# Patient Record
Sex: FEMALE | Race: WHITE | NOT HISPANIC OR LATINO | Employment: UNEMPLOYED | URBAN - METROPOLITAN AREA
[De-identification: names, ages, dates, MRNs, and addresses within clinical notes are randomized per-mention and may not be internally consistent; named-entity substitution may affect disease eponyms.]

---

## 2023-08-27 ENCOUNTER — HOSPITAL ENCOUNTER (EMERGENCY)
Facility: HOSPITAL | Age: 43
Discharge: HOME | End: 2023-08-27
Attending: EMERGENCY MEDICINE
Payer: OTHER GOVERNMENT

## 2023-08-27 VITALS
DIASTOLIC BLOOD PRESSURE: 75 MMHG | RESPIRATION RATE: 18 BRPM | TEMPERATURE: 99 F | HEIGHT: 64 IN | SYSTOLIC BLOOD PRESSURE: 101 MMHG | HEART RATE: 75 BPM | OXYGEN SATURATION: 98 % | BODY MASS INDEX: 25.61 KG/M2 | WEIGHT: 150 LBS

## 2023-08-27 DIAGNOSIS — L03.113 CELLULITIS OF RIGHT UPPER EXTREMITY: Primary | ICD-10-CM

## 2023-08-27 PROCEDURE — 99283 EMERGENCY DEPT VISIT LOW MDM: CPT

## 2023-08-27 RX ORDER — SULFAMETHOXAZOLE AND TRIMETHOPRIM 800; 160 MG/1; MG/1
1 TABLET ORAL 2 TIMES DAILY
Qty: 14 TABLET | Refills: 0 | Status: SHIPPED | OUTPATIENT
Start: 2023-08-27 | End: 2023-09-03

## 2023-08-27 NOTE — ED PROVIDER NOTES
Encounter Date: 8/27/2023       History     Chief Complaint   Patient presents with    Rash     [place] and now has a rash to her right arm.      41-year-old female no past medical history presents emergency room for evaluation of rash.  Patient [place] and was constant, and then noticed a lesion that looks like a bug bite.  She said then progressively developed into what it looks like now.  Never purulent.  No associated systemic symptoms.      Review of patient's allergies indicates:  No Known Allergies  No past medical history on file.  No past surgical history on file.  No family history on file.     Review of Systems   Constitutional:  Negative for chills, fatigue and fever.   HENT:  Negative for congestion, hearing loss, sore throat and trouble swallowing.    Eyes:  Negative for visual disturbance.   Respiratory:  Negative for cough, chest tightness and shortness of breath.    Cardiovascular:  Negative for chest pain.   Gastrointestinal:  Negative for abdominal pain and nausea.   Endocrine: Negative for polyuria.   Genitourinary:  Negative for difficulty urinating.   Musculoskeletal:  Negative for arthralgias and myalgias.   Skin:  Positive for rash.   Neurological:  Negative for dizziness and headaches.   Psychiatric/Behavioral:  The patient is not nervous/anxious.        Physical Exam     Initial Vitals [08/27/23 1613]   BP Pulse Resp Temp SpO2   101/75 75 18 98.5 °F (36.9 °C) 98 %      MAP       --         Physical Exam    Nursing note and vitals reviewed.  Constitutional: She appears well-developed and well-nourished.   HENT:   Head: Normocephalic and atraumatic.   Eyes: Conjunctivae are normal.   Neck: Neck supple.   Cardiovascular:  Intact distal pulses.           Pulmonary/Chest: No respiratory distress.   Musculoskeletal:         General: Normal range of motion.      Cervical back: Neck supple.     Neurological: She is alert and oriented to person, place, and time.  GCS score is 15. GCS eye subscore is 4. GCS verbal subscore is 5. GCS motor subscore is 6.   Skin: Skin is warm and dry. Capillary refill takes less than 2 seconds.   There is a 0.5 x 1 cm area of erythema and mild induration without fluctuance over the right anterior arm.   Psychiatric: She has a normal mood and affect. Thought content normal.         ED Course   Procedures  Labs Reviewed - No data to display       Imaging Results    None          Medications - No data to display  Medical Decision Making  43-year-old female with no past medical history presents for evaluation of rash.  Likely there is a mild bacterial infection/cellulitic infection to the right anterior arm.  Patient is a systemic signs of illness.  She is certainly nontoxic well-appearing.  I will initiate Bactrim given recent [place] a [place] however expect she will improve with outpatient management.  Return precautions discussed.        Risk  Prescription drug management.                               Clinical Impression:   Final diagnoses:  [L03.113] Cellulitis of right upper extremity (Primary)        ED Disposition Condition    Discharge Stable          ED Prescriptions       Medication Sig Dispense Start Date End Date Auth. Provider    sulfamethoxazole-trimethoprim 800-160mg (BACTRIM DS) 800-160 mg Tab Take 1 tablet by mouth 2 (two) times daily. for 7 days 14 tablet 8/27/2023 9/3/2023 Farshad Gaming PA-C          Follow-up Information       Follow up With Specialties Details Why Contact Info Additional Information    [facility] - Emergency Dept Emergency Medicine Go to  As needed, If symptoms worsen [address] [facility] [address]  [phone] 1st floor             Farshad Gaming PA-C  08/27/23 0687

## 2023-08-27 NOTE — DISCHARGE INSTRUCTIONS
- Please read all discharge instructions. Follow all written and verbal discharge instructions.   - Continue all home medications as prescribed and start any new prescriptions given to you in the emergency room.   - Followup with your primary care and make them aware of your recent ER visit. Followup with any additional providers or specialists as discussed.   - Return to the nearest emergency room for any new or worsening symptoms, non-resolution of your current symptoms or if you feel you need reevaluation.    - Do not hesitate to contact the emergency department if you have questions about your diagnosis, discharge instructions, followup recommendations, or medications.

## 2023-10-08 ENCOUNTER — HOSPITAL ENCOUNTER (EMERGENCY)
Facility: HOSPITAL | Age: 43
Discharge: HOME | End: 2023-10-08
Attending: STUDENT IN AN ORGANIZED HEALTH CARE EDUCATION/TRAINING PROGRAM
Payer: OTHER GOVERNMENT

## 2023-10-08 VITALS
HEIGHT: 64 IN | BODY MASS INDEX: 25.61 KG/M2 | OXYGEN SATURATION: 100 % | TEMPERATURE: 98 F | SYSTOLIC BLOOD PRESSURE: 119 MMHG | HEART RATE: 72 BPM | DIASTOLIC BLOOD PRESSURE: 74 MMHG | RESPIRATION RATE: 18 BRPM | WEIGHT: 150 LBS

## 2023-10-08 DIAGNOSIS — R21 RASH AND NONSPECIFIC SKIN ERUPTION: Primary | ICD-10-CM

## 2023-10-08 PROCEDURE — 99283 EMERGENCY DEPT VISIT LOW MDM: CPT

## 2023-10-08 RX ORDER — CLOTRIMAZOLE 1 %
CREAM (GRAM) TOPICAL 2 TIMES DAILY
Qty: 12 G | Refills: 0 | Status: SHIPPED | OUTPATIENT
Start: 2023-10-08

## 2023-10-08 NOTE — DISCHARGE INSTRUCTIONS

## 2023-10-08 NOTE — ED PROVIDER NOTES
Encounter Date: 10/8/2023       History     Chief Complaint   Patient presents with    Rash     Right arm, right face, abdomen     43-year-old female presents for three small lesions, a centimeter area on her right medial forearm, another on the right lateral temporal, and a third on the left side of her stomach. She was seen here a few weeks ago for a similar lesion on the right upper arm, which healed on its own.       Review of patient's allergies indicates:  No Known Allergies  No past medical history on file.  No past surgical history on file.  No family history on file.     Review of Systems   Constitutional:  Negative for activity change, appetite change, chills, fever and unexpected weight change.   HENT:  Negative for dental problem and drooling.    Eyes:  Negative for discharge and itching.   Respiratory:  Negative for cough, chest tightness, shortness of breath, wheezing and stridor.    Cardiovascular:  Negative for chest pain, palpitations and leg swelling.   Gastrointestinal:  Negative for abdominal distention, abdominal pain, diarrhea and nausea.   Genitourinary:  Negative for difficulty urinating, dysuria, frequency and urgency.   Musculoskeletal:  Negative for back pain, gait problem and joint swelling.   Skin:  Positive for rash.   Neurological:  Negative for dizziness, syncope, numbness and headaches.   Psychiatric/Behavioral:  Negative for agitation, behavioral problems and confusion.        Physical Exam     Initial Vitals [10/08/23 0939]   BP Pulse Resp Temp SpO2   123/85 67 18 98.4 °F (36.9 °C) 98 %      MAP       --         Physical Exam    Nursing note and vitals reviewed.  Constitutional: She appears well-developed and well-nourished. She is not diaphoretic.   HENT:   Head: Normocephalic and atraumatic.   Mouth/Throat: Oropharynx is clear and moist.   Eyes: EOM are normal. Pupils are equal, round, and reactive to light. Right eye exhibits no discharge. Left eye exhibits no discharge.   Neck:  No tracheal deviation present.   Normal range of motion.  Cardiovascular:  Normal rate, regular rhythm and intact distal pulses.           Pulmonary/Chest: No respiratory distress. She has no wheezes. She exhibits no tenderness.   Abdominal: Abdomen is soft. She exhibits no distension. There is no abdominal tenderness.   Musculoskeletal:         General: No tenderness or edema. Normal range of motion.      Cervical back: Normal range of motion.     Neurological: She is alert and oriented to person, place, and time. She has normal strength. No cranial nerve deficit or sensory deficit. GCS eye subscore is 4. GCS verbal subscore is 5. GCS motor subscore is 6.   Skin: Skin is warm and dry. Rash noted.   Small lesion on the right upper arm, left lower abdomen and right temporal region   Psychiatric: She has a normal mood and affect. Her behavior is normal. Thought content normal.         ED Course   Procedures  Labs Reviewed - No data to display       Imaging Results    None          Medications - No data to display  Medical Decision Making                           Lesions are subcentimeter, spread out over space and time, covering the right medial arm, right temporal region, left lower quadrant.  They are nontender, nonedematous, doubt acute infection such as cellulitis.  Given the patient's child was diagnosed with ringworm, will give antifungals.  They could also be erythematous reaction to excoriation, versus hypersensitivity reaction.  No evidence of systemic infection, no evidence of anaphylaxis, no evidence of Brooks Javed syndrome, no evidence of life-threatening rash at this time.  Advised patient to follow-up outpatient with Dermatology.    Clinical Impression:   Final diagnoses:  [R21] Rash and nonspecific skin eruption (Primary)        ED Disposition Condition    Discharge Stable          ED Prescriptions       Medication Sig Dispense Start Date End Date Auth. Provider    clotrimazole (LOTRIMIN) 1 % cream  Apply topically 2 (two) times daily. 12 g 10/8/2023 -- Nilson Nassar MD          Follow-up Information       Follow up With Specialties Details Why Contact Info    [facility] Call in 1 day To set up a follow-up appointment, To recheck today's symptoms [address] [address]  [phone]             Nilson Nassar MD  10/08/23 1512

## 2023-10-09 ENCOUNTER — TELEPHONE (OUTPATIENT)
Dept: ADMINISTRATIVE | Facility: CLINIC | Age: 43
End: 2023-10-09

## 2023-10-16 ENCOUNTER — TELEPHONE (OUTPATIENT)
Dept: FAMILY MEDICINE | Facility: CLINIC | Age: 43
End: 2023-10-16
Payer: OTHER GOVERNMENT

## 2023-10-16 NOTE — TELEPHONE ENCOUNTER
Called and spoke to pt about setting up Dermatology appt p/Referral  Pt says she spoke to her Insurance Provider and was told she needed to be seen by a pcp first, She has an appt this Wednesday to get this taken care of

## 2023-10-18 ENCOUNTER — TELEPHONE (OUTPATIENT)
Dept: FAMILY MEDICINE | Facility: CLINIC | Age: 43
End: 2023-10-18

## 2023-10-18 ENCOUNTER — OFFICE VISIT (OUTPATIENT)
Dept: FAMILY MEDICINE | Facility: CLINIC | Age: 43
End: 2023-10-18
Payer: OTHER GOVERNMENT

## 2023-10-18 VITALS
HEIGHT: 64 IN | DIASTOLIC BLOOD PRESSURE: 82 MMHG | OXYGEN SATURATION: 98 % | HEART RATE: 83 BPM | WEIGHT: 158.81 LBS | BODY MASS INDEX: 27.11 KG/M2 | SYSTOLIC BLOOD PRESSURE: 116 MMHG

## 2023-10-18 DIAGNOSIS — Z01.419 ENCOUNTER FOR CERVICAL PAP SMEAR WITH PELVIC EXAM: ICD-10-CM

## 2023-10-18 DIAGNOSIS — Z76.89 ESTABLISHING CARE WITH NEW DOCTOR, ENCOUNTER FOR: Primary | ICD-10-CM

## 2023-10-18 DIAGNOSIS — L08.9 SKIN INFECTION: ICD-10-CM

## 2023-10-18 DIAGNOSIS — Z12.11 ENCOUNTER FOR COLONOSCOPY IN PATIENT WITH FAMILY HISTORY OF COLON CANCER: ICD-10-CM

## 2023-10-18 DIAGNOSIS — Z80.0 ENCOUNTER FOR COLONOSCOPY IN PATIENT WITH FAMILY HISTORY OF COLON CANCER: ICD-10-CM

## 2023-10-18 DIAGNOSIS — Z12.31 OTHER SCREENING MAMMOGRAM: ICD-10-CM

## 2023-10-18 PROCEDURE — 99204 PR OFFICE/OUTPT VISIT, NEW, LEVL IV, 45-59 MIN: ICD-10-PCS | Mod: S$GLB,,,

## 2023-10-18 PROCEDURE — 99204 OFFICE O/P NEW MOD 45 MIN: CPT | Mod: S$GLB,,,

## 2023-10-18 RX ORDER — ALBUTEROL SULFATE 90 UG/1
AEROSOL, METERED RESPIRATORY (INHALATION)
COMMUNITY
Start: 2023-07-12 | End: 2024-07-10

## 2023-10-18 RX ORDER — ALBUTEROL SULFATE 90 UG/1
AEROSOL, METERED RESPIRATORY (INHALATION)
COMMUNITY
Start: 2023-07-12

## 2023-10-18 RX ORDER — LORATADINE 10 MG/1
10 TABLET ORAL
COMMUNITY
Start: 2023-07-05

## 2023-10-18 RX ORDER — MUPIROCIN 20 MG/G
OINTMENT TOPICAL 3 TIMES DAILY
Qty: 22 G | Refills: 2 | Status: SHIPPED | OUTPATIENT
Start: 2023-10-18

## 2023-10-18 RX ORDER — OMEPRAZOLE 20 MG/1
20 CAPSULE, DELAYED RELEASE ORAL
COMMUNITY
Start: 2023-05-19

## 2023-10-18 RX ORDER — PANTOPRAZOLE SODIUM 20 MG/1
20 TABLET, DELAYED RELEASE ORAL
COMMUNITY
Start: 2023-07-12

## 2023-10-18 RX ORDER — FLUTICASONE PROPIONATE AND SALMETEROL XINAFOATE 45; 21 UG/1; UG/1
AEROSOL, METERED RESPIRATORY (INHALATION)
COMMUNITY
Start: 2023-07-12

## 2023-10-18 RX ORDER — KETOCONAZOLE 20 MG/G
CREAM TOPICAL DAILY
Qty: 15 G | Refills: 2 | Status: SHIPPED | OUTPATIENT
Start: 2023-10-18

## 2023-10-18 NOTE — TELEPHONE ENCOUNTER
----- Message from Latanya Medellin sent at 10/18/2023 12:12 PM CDT -----  Pt would like to know when she needs to schedule her next appointment.  [phone]

## 2023-10-18 NOTE — PATIENT INSTRUCTIONS
Hibiclens or store brand equivalent over the counter antibacterial soap. Wash daily, neck down. Do not use on face.    Nizoral is an antifungal shampoo over the counter.    Dr. Cruz with [facility] Dr. Weil external

## 2023-10-19 ENCOUNTER — TELEPHONE (OUTPATIENT)
Dept: FAMILY MEDICINE | Facility: CLINIC | Age: 43
End: 2023-10-19
Payer: OTHER GOVERNMENT

## 2023-10-19 NOTE — TELEPHONE ENCOUNTER
Spoke to pt and informed no soone appt's with Derm  She will call for an External appt w/Dr M. Weil p/Referral

## 2023-10-20 ENCOUNTER — TELEPHONE (OUTPATIENT)
Dept: FAMILY MEDICINE | Facility: CLINIC | Age: 43
End: 2023-10-20

## 2023-10-20 NOTE — TELEPHONE ENCOUNTER
----- Message from Latanya Medellin sent at 10/20/2023  9:08 AM CDT -----  Pt calling to get her referral sent to Dr. Michael Weil on [address] because they can get her in the office in November.    [phone]

## 2023-10-20 NOTE — PROGRESS NOTES
SUBJECTIVE:    Patient ID: Lizbeth Berry is a 43 y.o. female.    Chief Complaint: Establish Care (No bottles//Pt here to establish care//discuss rash and derm referral//declines flu vacc//JL)    43 year old female presents to clinic today to establish care. Since moving here in august from [place], with her  and 10 year old son, she has been experiencing a rash. She has presented to urgent care for this, and the emergency room. At first she thought she was being bitten by insects and having an allergic reaction. She states each spot started out looking like a mosquito bite. Er told her they thought ringworm, because her son had been diagnosed with ringworm to scalp and her  also had some lesions. They have used prescribed creams, which have not cleared the lesions. She states they have cleaned their home from top to bottom. Washed linens and clothing in sensitive skin detergent. Changed soaps etc to sensitive skin preparations and done all the things they can think of to avoid recurrence of whatever this is, including she and her  are using separate blankets and wearing long sleeves to bed. Still getting lesions. And they do itch. She received a referral to derm from the er but was told she would need a referral from her pcp.  While patient is here, she would like to get an order for a mammogram, referral to gyn to est care, and a referral to GI for an appt to see about starting colonoscopies because of family history of early colon ca.         No results found for any previous visit.       Past Medical History:   Diagnosis Date    Bronchial atresia      Social History     Socioeconomic History    Marital status:    Tobacco Use    Smoking status: Never    Smokeless tobacco: Never   Substance and Sexual Activity    Alcohol use: Yes     Alcohol/week: 2.0 standard drinks of alcohol     Types: 2 Glasses of wine per week     History reviewed. No pertinent surgical history.  History  reviewed. No pertinent family history.    Review of patient's allergies indicates:  No Known Allergies    Current Outpatient Medications:     albuterol (PROVENTIL/VENTOLIN HFA) 90 mcg/actuation inhaler, Take or use exactly as directed.Obtain advice for OTCs.Shake well.For inhalation., Disp: , Rfl:     ADVAIR HFA 45-21 mcg/actuation HFAA inhaler, Inhale into the lungs., Disp: , Rfl:     albuterol (PROVENTIL/VENTOLIN HFA) 90 mcg/actuation inhaler, Inhale into the lungs., Disp: , Rfl:     clotrimazole (LOTRIMIN) 1 % cream, Apply topically 2 (two) times daily., Disp: 12 g, Rfl: 0    ketoconazole (NIZORAL) 2 % cream, Apply topically once daily., Disp: 15 g, Rfl: 2    loratadine (CLARITIN) 10 mg tablet, Take 10 mg by mouth., Disp: , Rfl:     mupirocin (BACTROBAN) 2 % ointment, Apply topically 3 (three) times daily., Disp: 22 g, Rfl: 2    omeprazole (PRILOSEC) 20 MG capsule, Take 20 mg by mouth., Disp: , Rfl:     pantoprazole (PROTONIX) 20 MG tablet, Take 20 mg by mouth., Disp: , Rfl:     Review of Systems   Constitutional:  Negative for activity change, appetite change, chills, fatigue, fever and unexpected weight change.   HENT:  Negative for nasal congestion, ear pain, postnasal drip, rhinorrhea, sore throat and trouble swallowing.    Eyes:  Negative for discharge and visual disturbance.   Respiratory:  Negative for apnea, cough, shortness of breath and wheezing.    Cardiovascular:  Negative for chest pain, palpitations and leg swelling.   Gastrointestinal:  Negative for abdominal pain, blood in stool, constipation, diarrhea, nausea, vomiting and reflux.   Genitourinary:  Negative for bladder incontinence, dysuria, frequency and urgency.   Musculoskeletal:  Negative for arthralgias, gait problem, leg pain and myalgias.   Integumentary:  Positive for rash. Negative for mole/lesion.   Neurological:  Negative for dizziness, tremors, weakness, light-headedness, numbness and headaches.   Hematological:  Does not bruise/bleed  "easily.   Psychiatric/Behavioral:  Negative for dysphoric mood, sleep disturbance and suicidal ideas. The patient is not nervous/anxious.            Objective:      Vitals:    10/18/23 1108   BP: 116/82   Pulse: 83   SpO2: 98%   Weight: 72 kg (158 lb 12.8 oz)   Height: 5' 4" (1.626 m)     Physical Exam  Vitals and nursing note reviewed.   Constitutional:       General: She is not in acute distress.     Appearance: Normal appearance. She is well-developed and normal weight. She is not ill-appearing.   HENT:      Head: Normocephalic and atraumatic.        Right Ear: Tympanic membrane, ear canal and external ear normal. There is no impacted cerumen.      Left Ear: Tympanic membrane, ear canal and external ear normal. There is no impacted cerumen.      Nose: Nose normal. No congestion or rhinorrhea.      Mouth/Throat:      Mouth: Mucous membranes are moist.      Pharynx: Oropharynx is clear. No oropharyngeal exudate or posterior oropharyngeal erythema.   Eyes:      General: No scleral icterus.     Pupils: Pupils are equal, round, and reactive to light.   Neck:      Thyroid: No thyromegaly.      Vascular: No carotid bruit.   Cardiovascular:      Rate and Rhythm: Normal rate and regular rhythm.      Pulses: Normal pulses.      Heart sounds: Normal heart sounds. No murmur heard.  Pulmonary:      Effort: Pulmonary effort is normal.      Breath sounds: Normal breath sounds. No wheezing or rales.   Abdominal:      General: There is no distension.      Palpations: Abdomen is soft.      Tenderness: There is no abdominal tenderness.       Musculoskeletal:         General: Normal range of motion.      Cervical back: Normal range of motion and neck supple.      Lumbar back: Normal. No spasms.      Right lower leg: No edema.      Left lower leg: No edema.      Comments: Bends 90 degrees at  waist. Good ROM throughout   Skin:     General: Skin is warm and dry.      Capillary Refill: Capillary refill takes less than 2 seconds.      " Coloration: Skin is not jaundiced or pale.      Findings: Rash present.          Neurological:      General: No focal deficit present.      Mental Status: She is alert and oriented to person, place, and time.      Cranial Nerves: No cranial nerve deficit.      Motor: No weakness.      Coordination: Coordination normal.      Gait: Gait normal.   Psychiatric:         Mood and Affect: Mood normal.         Behavior: Behavior normal.         Thought Content: Thought content normal.         Judgment: Judgment normal.           Assessment:       1. Establishing care with new doctor, encounter for    2. Skin infection    3. Encounter for colonoscopy in patient with family history of colon cancer    4. Other screening mammogram    5. Encounter for cervical Pap smear with pelvic exam         Plan:       Establishing care with new doctor, encounter for    Skin infection  Lesions do not look psoriatic. Possibly ringworm, but also have a pityriasis appearance. Lesion to face looks more like impetigo. Pt states this lesion and one to her arm did have honey crusted surface at one point and one on her arm has some clearish/ straw colored ooze.   She was instructed by someone with her insurance to request and internal AND and external referral and see whoever is available first.   -     Ambulatory referral/consult to Dermatology; Future; Expected date: 10/25/2023  -     mupirocin (BACTROBAN) 2 % ointment; Apply topically 3 (three) times daily.  Dispense: 22 g; Refill: 2  -     ketoconazole (NIZORAL) 2 % cream; Apply topically once daily.  Dispense: 15 g; Refill: 2  -     Ambulatory referral/consult to Dermatology; Future; Expected date: 10/25/2023    Encounter for colonoscopy in patient with family history of colon cancer  -     Ambulatory referral/consult to Gastroenterology; Future; Expected date: 10/25/2023    Other screening mammogram  -     Mammo Digital Screening Bilat; Future; Expected date: 10/18/2023    Encounter for  cervical Pap smear with pelvic exam  -     Ambulatory referral/consult to Obstetrics / Gynecology; Future; Expected date: 10/25/2023      Follow up if symptoms worsen or fail to improve, for ANNUAL.        10/19/2023 Kaelyn Pastrana

## 2023-10-23 ENCOUNTER — TELEPHONE (OUTPATIENT)
Dept: FAMILY MEDICINE | Facility: CLINIC | Age: 43
End: 2023-10-23
Payer: OTHER GOVERNMENT

## 2023-11-21 ENCOUNTER — TELEPHONE (OUTPATIENT)
Dept: FAMILY MEDICINE | Facility: CLINIC | Age: 43
End: 2023-11-21

## 2023-11-21 NOTE — TELEPHONE ENCOUNTER
Faxed dermatology referral and face sheet to Dr. Weil, fax: [phone]     LMOR to patient informing referral sent to Dr. Weil. Call if has questions.     Called Dr. Weil office, informed referral faxed to Dr. Weil. Nurse station nurse will inform .

## 2023-11-21 NOTE — TELEPHONE ENCOUNTER
----- Message from Kaelyn Salinas sent at 11/21/2023 10:36 AM CST -----  Referral updated, please create message and sign off. Patient notified   ----- Message -----  From: Ashlyn Morfin  Sent: 11/21/2023   8:34 AM CST  To: Kaelyn Salinas    The patient needed a referral to see Dr. Weil not Dr. Cruz. She went to see Dr. Weil and they told her they had a referral for Dr. Cruz. [facility].. She needs a referral to see Dr. Weil. She has a rash on her arm, face, and the side of her stomach. Please call ASAP when the referral is done. Please make sure Dr. Weil gets this referral. They need us to send it to dr. Weil.  pt's # [phone] GH

## 2024-07-22 ENCOUNTER — TELEPHONE (OUTPATIENT)
Dept: FAMILY MEDICINE | Facility: CLINIC | Age: 44
End: 2024-07-22
Payer: OTHER GOVERNMENT

## 2024-07-22 DIAGNOSIS — L70.9 ACNE, UNSPECIFIED ACNE TYPE: ICD-10-CM

## 2024-07-22 DIAGNOSIS — R63.5 WEIGHT GAIN: Primary | ICD-10-CM

## 2024-07-22 DIAGNOSIS — R45.86 LABILE MOOD: ICD-10-CM

## 2024-07-22 NOTE — TELEPHONE ENCOUNTER
Does she want thyroid levels checked, or is she looking to see if she is having menopausal symptoms? I just want to know what to put on the referral. If she wants estrogen and labs along those lines, yes, GYN.

## 2024-07-22 NOTE — TELEPHONE ENCOUNTER
Spoke with pt pt states her body is having extreme changes. Acne, mood swings, weight gain. States she would like hormonal testing to see what is going on. Advised pt that we do not do that here she will need to see GYN.   Pt will need new referral since she never used last referral in October.     Can you add dx code?

## 2024-07-22 NOTE — TELEPHONE ENCOUNTER
LMOR for a call back. Advising pt we do not do hormone testing. Advised to call back if she would like a referral.

## 2024-07-22 NOTE — TELEPHONE ENCOUNTER
----- Message from Alka Schwarz sent at 7/22/2024  2:14 PM CDT -----  Pt needs her hormone levels checked. Please advise. Pt #[phone]

## 2024-07-29 ENCOUNTER — TELEPHONE (OUTPATIENT)
Dept: FAMILY MEDICINE | Facility: CLINIC | Age: 44
End: 2024-07-29
Payer: OTHER GOVERNMENT

## 2024-07-29 DIAGNOSIS — Z01.419 ENCOUNTER FOR CERVICAL PAP SMEAR WITH PELVIC EXAM: ICD-10-CM

## 2024-07-29 DIAGNOSIS — R63.5 WEIGHT GAIN: Primary | ICD-10-CM

## 2024-07-29 DIAGNOSIS — L70.9 ACNE, UNSPECIFIED ACNE TYPE: ICD-10-CM

## 2024-07-29 NOTE — TELEPHONE ENCOUNTER
----- Message from Génesis Arnold sent at 7/29/2024  9:20 AM CDT -----  OBGYN not receiving any new pt is there any way another referral could be sent elsewhere?  [phone]

## 2024-07-29 NOTE — TELEPHONE ENCOUNTER
----- Message from Ashlyn Morfin sent at 7/29/2024  8:21 AM CDT -----  The patient called last week to get a referral for an OBGYN. They did not get it. Can you send it again. She is calling this morning to set  her appointment up. Pt's  #  [phone] GH

## 2024-07-30 ENCOUNTER — TELEPHONE (OUTPATIENT)
Dept: FAMILY MEDICINE | Facility: CLINIC | Age: 44
End: 2024-07-30
Payer: OTHER GOVERNMENT

## 2024-07-30 DIAGNOSIS — R45.86 MOOD SWINGS: ICD-10-CM

## 2024-07-30 DIAGNOSIS — R63.5 WEIGHT GAIN: ICD-10-CM

## 2024-07-30 DIAGNOSIS — N92.6 IRREGULAR MENSTRUAL CYCLE: Primary | ICD-10-CM

## 2024-07-30 NOTE — TELEPHONE ENCOUNTER
----- Message from Kaelyn Salinas sent at 7/30/2024  9:48 AM CDT -----  she needs a referral to dr. marie for cycle irregular , weight gain, mood swings  [phone]

## 2024-07-30 NOTE — TELEPHONE ENCOUNTER
----- Message from Ashlyn Morfin sent at 7/30/2024  8:57 AM CDT -----  The patient has some questions about her  referral to the OBGYN. Pt's # [phone]. She was referred to Dr. Diggs she is not see new patients right now. Please call  She needs to get her hormones checked ASAP. GH

## 2024-07-30 NOTE — TELEPHONE ENCOUNTER
Spoke with pt. States she got a call yesterday that Dr. Diggs is not accepting new pts at this time     Spoke with Haim at [facility]. She states all the providers are accepting new pts.     Called pt back advised her to speak with John to schedule appt.

## 2024-09-20 ENCOUNTER — TELEPHONE (OUTPATIENT)
Dept: FAMILY MEDICINE | Facility: CLINIC | Age: 44
End: 2024-09-20
Payer: OTHER GOVERNMENT

## 2024-09-20 DIAGNOSIS — Z00.00 ROUTINE GENERAL MEDICAL EXAMINATION AT A HEALTH CARE FACILITY: Primary | ICD-10-CM

## 2024-10-04 ENCOUNTER — TELEPHONE (OUTPATIENT)
Dept: FAMILY MEDICINE | Facility: CLINIC | Age: 44
End: 2024-10-04
Payer: OTHER GOVERNMENT

## 2024-10-04 PROBLEM — K21.9 GASTROESOPHAGEAL REFLUX DISEASE WITHOUT ESOPHAGITIS: Status: ACTIVE | Noted: 2023-07-11

## 2024-10-04 PROBLEM — R87.610 ATYPICAL SQUAMOUS CELLS OF UNDETERMINED SIGNIFICANCE (ASCUS) ON PAPANICOLAOU SMEAR OF CERVIX: Status: ACTIVE | Noted: 2024-10-04

## 2024-10-04 PROBLEM — K59.00 CONSTIPATION: Status: ACTIVE | Noted: 2024-10-04

## 2024-10-04 PROBLEM — B00.9 HERPES SIMPLEX TYPE 1 INFECTION: Status: ACTIVE | Noted: 2024-10-04

## 2024-10-04 PROBLEM — M25.552 PAIN OF LEFT HIP JOINT: Status: ACTIVE | Noted: 2024-10-04

## 2024-10-04 PROBLEM — L50.1 IDIOPATHIC URTICARIA: Status: ACTIVE | Noted: 2023-07-11

## 2024-10-04 PROBLEM — J45.909 ASTHMA: Status: ACTIVE | Noted: 2023-07-11

## 2024-10-04 PROBLEM — R87.612 LOW GRADE SQUAMOUS INTRAEPITHELIAL CERVICAL DYSPLASIA AFFECTING PREGNANCY, ANTEPARTUM: Status: ACTIVE | Noted: 2024-10-04

## 2024-10-04 PROBLEM — N87.9 CERVICAL DYSPLASIA: Status: ACTIVE | Noted: 2024-10-04

## 2024-10-04 PROBLEM — M25.522 ARTHRALGIA OF LEFT ELBOW: Status: ACTIVE | Noted: 2023-08-04

## 2024-10-04 PROBLEM — N87.1 CERVICAL INTRAEPITHELIAL NEOPLASIA GRADE 2: Status: ACTIVE | Noted: 2024-10-04

## 2024-10-04 PROBLEM — J40 BRONCHITIS: Status: ACTIVE | Noted: 2024-10-04

## 2024-10-04 PROBLEM — R06.00 DYSPNEA: Status: ACTIVE | Noted: 2023-07-05

## 2024-10-04 PROBLEM — Q32.4: Status: ACTIVE | Noted: 2023-07-11

## 2024-10-04 PROBLEM — R91.8 ABNORMAL FINDINGS ON DIAGNOSTIC IMAGING OF LUNG: Status: ACTIVE | Noted: 2023-07-05

## 2024-10-04 PROBLEM — L85.8 KERATOSIS PILARIS: Status: ACTIVE | Noted: 2024-10-04

## 2024-10-04 PROBLEM — O34.40 LOW GRADE SQUAMOUS INTRAEPITHELIAL CERVICAL DYSPLASIA AFFECTING PREGNANCY, ANTEPARTUM: Status: ACTIVE | Noted: 2024-10-04

## 2024-10-04 PROBLEM — R10.13 DYSPEPSIA: Status: ACTIVE | Noted: 2024-10-04

## 2024-10-04 NOTE — TELEPHONE ENCOUNTER
----- Message from LADONNA Marcano sent at 10/4/2024  8:38 AM CDT -----  Abnormal values appear consistent with fasting and maybe slight dehydration. As long as patient is not experiencing any concerning symptoms from low blood glucose, will discuss results at upcoming visit.

## 2024-10-04 NOTE — TELEPHONE ENCOUNTER
Spoke with patient gave information verbatim per Nai MILLER Patient verbalized understanding. Aware has appt on 10/07/2024 at 820 am with Nai MILLER Will discuss lab results at appt.

## 2024-10-07 ENCOUNTER — OFFICE VISIT (OUTPATIENT)
Dept: FAMILY MEDICINE | Facility: CLINIC | Age: 44
End: 2024-10-07
Payer: OTHER GOVERNMENT

## 2024-10-07 ENCOUNTER — TELEPHONE (OUTPATIENT)
Dept: FAMILY MEDICINE | Facility: CLINIC | Age: 44
End: 2024-10-07

## 2024-10-07 VITALS
DIASTOLIC BLOOD PRESSURE: 70 MMHG | WEIGHT: 163 LBS | BODY MASS INDEX: 27.83 KG/M2 | OXYGEN SATURATION: 98 % | SYSTOLIC BLOOD PRESSURE: 110 MMHG | HEIGHT: 64 IN | HEART RATE: 62 BPM

## 2024-10-07 DIAGNOSIS — Z80.0 FAMILY HISTORY OF COLON CANCER: ICD-10-CM

## 2024-10-07 DIAGNOSIS — F51.04 PSYCHOPHYSIOLOGICAL INSOMNIA: ICD-10-CM

## 2024-10-07 DIAGNOSIS — K59.04 CHRONIC IDIOPATHIC CONSTIPATION: ICD-10-CM

## 2024-10-07 DIAGNOSIS — G43.919 INTRACTABLE MIGRAINE WITHOUT STATUS MIGRAINOSUS, UNSPECIFIED MIGRAINE TYPE: Primary | ICD-10-CM

## 2024-10-07 DIAGNOSIS — Z80.1 FAMILY HISTORY OF LUNG CANCER: ICD-10-CM

## 2024-10-07 DIAGNOSIS — Q32.4 BRONCHIAL ATRESIA: ICD-10-CM

## 2024-10-07 DIAGNOSIS — F41.9 MILD ANXIETY: ICD-10-CM

## 2024-10-07 DIAGNOSIS — L70.9 ACNE, UNSPECIFIED ACNE TYPE: ICD-10-CM

## 2024-10-07 DIAGNOSIS — R45.86 LABILE MOOD: ICD-10-CM

## 2024-10-07 DIAGNOSIS — Z01.419 ENCOUNTER FOR CERVICAL PAP SMEAR WITH PELVIC EXAM: ICD-10-CM

## 2024-10-07 DIAGNOSIS — N92.6 IRREGULAR MENSTRUAL CYCLE: ICD-10-CM

## 2024-10-07 DIAGNOSIS — R53.83 FATIGUE, UNSPECIFIED TYPE: ICD-10-CM

## 2024-10-07 DIAGNOSIS — R63.5 WEIGHT GAIN: ICD-10-CM

## 2024-10-07 DIAGNOSIS — Z87.09 HISTORY OF REACTIVE AIRWAY DISEASE: ICD-10-CM

## 2024-10-07 PROCEDURE — 99214 OFFICE O/P EST MOD 30 MIN: CPT | Mod: S$GLB,,,

## 2024-10-07 NOTE — PROGRESS NOTES
SUBJECTIVE:    Patient ID: Lizbeth Berry is a 44 y.o. female.    Chief Complaint: Annual Exam (Went over meds verbally, stomach issues, fatigue, whalen, insomnia, weight gain, review Lab -results, upcoming mammogram 10/11/24, declined flu vaccine, abc )    54-year-old established female patient presents to clinic today for follow-up.  We did discuss her lab results in detail today.    Patient has several things that she would like to discuss today.  We did discuss some of these at her previous visit, some referrals were made, however patient did have some complications with referrals.  She got frustrated and gave up on trying to get in with the providers that I referred her to have her complaints evaluated further.    Primary concern today is daily headaches.  I did do headache evaluation with her today.  She does have a history of migraines.  She takes Tylenol almost daily for pain.  She does get some relief.  The headaches do end up coming back.  She states that they are worse when she does not get good sleep.  Her  is active duty  and just left for a deployment and will be gone for 8 months.  She states she does not sleep well when he is not here and she has gotten maybe 3 hours of sleep at night for the last 2 weeks.  She has never taken medication to prevent migraines.  She states she did get acupuncture in the past and this did help prevent her from having headaches.  She is interested in a neurology referral.  She does not want medications aside from over-the-counter.  She states she does not like to take medications unless absolutely necessary and these headaches are not debilitating her, she just pushes through the pain and continues to live life.    Patient also does not want anything very sedating to help her with her insomnia.  She has taken Benadryl occasionally which does give her some drowsiness and allow her to sleep.  We did discuss other options today like over-the-counter  doxylamine and melatonin.    She does limit herself to 1 serving of caffeine a day because she states that she is very sensitive to the effects of caffeine.    She also reports that she has been eating very healthy.  Despite this she has gained 25 lb in the last year.  She feels this is all hormonal.  She states that all of her weight gain is in her abdomen.  She would like to have her cortisol checked.  She does have other symptoms that she relates to perimenopausal hormone changes.  We did attempt referrals to 3 different gynecology providers in the past.  Today I am going to refer her once again and she plans to discuss these concerns with the gynecologist I recommend.  Other symptoms include acne along her chin, fatigue, mood swings.    Patient reiterates today that her mother passed away at the age of 43 from lung cancer, was a smoker.  Patient also reports that she has had 13 other relatives in her family that has been diagnosed with either lung cancer or some other form of chronic lung disease, including her sister who is not a smoker.  She is very concerned about her high-risk.  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  She also states that she is very reactive airways and occasionally has shortness a breath and needs to use albuterol inhalers 2 relieve this.  Has never been diagnosed with asthma.  Would like a referral to pulmonology.    She also reiterates today that her father passed away at the age of 53 from a stroke.  She states that he was also a smoker and had many other risk factors.  She is concerned about her cholesterol levels.  We did discuss her ASCVD risk score below.  We also discussed healthy lifestyle for cardiac and overall health.  Again, patient does not like to  take pharmaceutical medications unless absolutely no other option.    The 10-year ASCVD risk score (Milad MONTSE, et al., 2019) is: 0.4%    Values used to calculate the score:      Age: 44 years      Sex: Female      Is Non- : No      Diabetic: No      Tobacco smoker: No      Systolic Blood Pressure: 110 mmHg      Is BP treated: No      HDL Cholesterol: 67 mg/dL      Total Cholesterol: 222 mg/dL    Hepatitis C Screening Never done  Mammogram Never done  TETANUS VACCINE due on 04/26/2023  Lipid Panel Completed                        Headache  female complains of a of headache. female does not have a headache at this time.    Description of Headaches:  Location of pain: occipital, frontal  Radiation of pain?:none  Character of pain:aching  Severity of pain: 7  Accompanying symptoms: sonophobia, photophobia  Prodromal sx?:   Rapidity of onset: sudden  Typical duration of individual headache: 4 hours  Are most headaches similar in presentation? yes  Typical precipitants: stress, change in sleep pattern    Temporal Pattern of Headaches:  Started having HA's 6 years ago  Worst time of day: morning  Awaken from sleep?: no  Seasonal pattern?: no  Clustering of HA's over time? yes - with lack of sleep  Overall pattern since problem began: stable    Degree of Functional Impairment: mild    Current Use of Meds to Treat HA:  Abortive meds? acetaminophen  Daily use? yes - tylenol  Prophylactic meds? none    Additional Relevant History:  History of head/neck trauma? no  History of head/neck surgery? no  Family h/o headache problems? no  Use of meds that might worsen HA's (nitrates, exogenous estrogens,    Nifedipine)? no  Exposure to carbon monoxide? no  Substance use: caffeine: daily  [place] Sleepiness Scale  Name: ________________________ Todays date: _________________    Your age (Yrs): _______________ Your sex (Male = M, Female = F): ________    How likely are you to doze off or fall asleep in the  following situations, in contrast to feeling just tired?  This refers to your usual way of life in recent times.     Even if you havent done some of these things recently try to work out how they would have affected you.    Use the following scale to choose the most appropriate number for each situation:  0 = would never doze  1 = slight chance of dozing  2 = moderate chance of dozing  3 = high chance of dozing  It is important that you answer each question as best you can.  Situation Chance of Dozing (0-3)  1. Sitting and reading ______0__________________________________    2. Watching TV ____________3____________________________    3. Sitting, inactive in a public place (e.g. a theatre or a meeting) ___0______    4. As a passenger in a car for an hour without a break ________0_________    5. Lying down to rest in the afternoon when circumstances permit __3______    6. Sitting and talking to someone ___________0_______________________    7. Sitting quietly after a lunch without alcohol ___________3_____________    8. In a car, while stopped for a few minutes in the traffic ______0__________    THANK YOU FOR YOUR COOPERATION    Telephone on 09/20/2024   Component Date Value Ref Range Status    WBC 10/02/2024 6.6  3.8 - 10.8 Thousand/uL Final    RBC 10/02/2024 5.08  3.80 - 5.10 Million/uL Final    Hemoglobin 10/02/2024 15.1  11.7 - 15.5 g/dL Final    Hematocrit 10/02/2024 46.1 (H)  35.0 - 45.0 % Final    MCV 10/02/2024 90.7  80.0 - 100.0 fL Final    MCH 10/02/2024 29.7  27.0 - 33.0 pg Final    MCHC 10/02/2024 32.8  32.0 - 36.0 g/dL Final    RDW 10/02/2024 12.4  11.0 - 15.0 % Final    Platelets 10/02/2024 246  140 - 400 Thousand/uL Final    MPV 10/02/2024 11.4  7.5 - 12.5 fL Final    Neutrophils, Abs 10/02/2024 4,633  1,500 - 7,800 cells/uL Final    Lymph # 10/02/2024 1,511  850 - 3,900 cells/uL Final    Mono # 10/02/2024 363  200 - 950 cells/uL Final    Eos # 10/02/2024 53  15 - 500 cells/uL Final    Baso #  10/02/2024 40  0 - 200 cells/uL Final    Neutrophils Relative 10/02/2024 70.2  % Final    Lymph % 10/02/2024 22.9  % Final    Mono % 10/02/2024 5.5  % Final    Eosinophil % 10/02/2024 0.8  % Final    Basophil % 10/02/2024 0.6  % Final    Glucose 10/02/2024 55 (L)  65 - 99 mg/dL Final    BUN 10/02/2024 13  7 - 25 mg/dL Final    Creatinine 10/02/2024 0.73  0.50 - 0.99 mg/dL Final    eGFR 10/02/2024 105  > OR = 60 mL/min/1.73m2 Final    BUN/Creatinine Ratio 10/02/2024 SEE NOTE:  6 - 22 (calc) Final    Sodium 10/02/2024 136  135 - 146 mmol/L Final    Potassium 10/02/2024 4.0  3.5 - 5.3 mmol/L Final    Chloride 10/02/2024 103  98 - 110 mmol/L Final    CO2 10/02/2024 18 (L)  20 - 32 mmol/L Final    Calcium 10/02/2024 9.5  8.6 - 10.2 mg/dL Final    Total Protein 10/02/2024 7.9  6.1 - 8.1 g/dL Final    Albumin 10/02/2024 4.8  3.6 - 5.1 g/dL Final    Globulin, Total 10/02/2024 3.1  1.9 - 3.7 g/dL (calc) Final    Albumin/Globulin Ratio 10/02/2024 1.5  1.0 - 2.5 (calc) Final    Total Bilirubin 10/02/2024 1.0  0.2 - 1.2 mg/dL Final    Alkaline Phosphatase 10/02/2024 72  31 - 125 U/L Final    AST 10/02/2024 19  10 - 30 U/L Final    ALT 10/02/2024 16  6 - 29 U/L Final    Cholesterol 10/02/2024 222 (H)  <200 mg/dL Final    HDL 10/02/2024 67  > OR = 50 mg/dL Final    Triglycerides 10/02/2024 73  <150 mg/dL Final    LDL Cholesterol 10/02/2024 138 (H)  mg/dL (calc) Final    HDL/Cholesterol Ratio 10/02/2024 3.3  <5.0 (calc) Final    Non HDL Chol. (LDL+VLDL) 10/02/2024 155 (H)  <130 mg/dL (calc) Final    TSH w/reflex to FT4 10/02/2024 0.59  mIU/L Final    Color, UA 10/02/2024 YELLOW  YELLOW Final    Appearance, UA 10/02/2024 CLEAR  CLEAR Final    Specific [place], UA 10/02/2024 1.021  1.001 - 1.035 Final    pH, UA 10/02/2024 5.5  5.0 - 8.0 Final    Glucose, UA 10/02/2024 NEGATIVE  NEGATIVE Final    Bilirubin, UA 10/02/2024 NEGATIVE  NEGATIVE Final    Ketones, UA 10/02/2024 3+ (A)  NEGATIVE Final    Occult Blood UA 10/02/2024 NEGATIVE   NEGATIVE Final    Protein, UA 10/02/2024 TRACE (A)  NEGATIVE Final    Nitrite, UA 10/02/2024 NEGATIVE  NEGATIVE Final    Leukocytes, UA 10/02/2024 NEGATIVE  NEGATIVE Final    WBC Casts, UA 10/02/2024 NONE SEEN  < OR = 5 /HPF Final    RBC Casts, UA 10/02/2024 NONE SEEN  < OR = 2 /HPF Final    Squam Epithel, UA 10/02/2024 0-5  < OR = 5 /HPF Final    Bacteria, UA 10/02/2024 NONE SEEN  NONE SEEN /HPF Final    Hyaline Casts, UA 10/02/2024 0-5 (A)  NONE SEEN /LPF Final    Service Cmt: 10/02/2024 SEE COMMENT   Final    Reflexive Urine Culture 10/02/2024 SEE COMMENT   Final       Past Medical History:   Diagnosis Date    Bronchial atresia      Social History     Socioeconomic History    Marital status:    Tobacco Use    Smoking status: Never    Smokeless tobacco: Never   Substance and Sexual Activity    Alcohol use: Yes     Alcohol/week: 2.0 standard drinks of alcohol     Types: 2 Glasses of wine per week     No past surgical history on file.  No family history on file.    The 10-year CVD risk score (ROSALIO'Agostino, et al., 2008) is: 2.1%    Values used to calculate the score:      Age: 44 years      Sex: Female      Diabetic: No      Tobacco smoker: No      Systolic Blood Pressure: 110 mmHg      Is BP treated: No      HDL Cholesterol: 67 mg/dL      Total Cholesterol: 222 mg/dL    Tests to Keep You Healthy    Mammogram: SCHEDULED  Cervical Cancer Screening: DUE      Review of patient's allergies indicates:  No Known Allergies    Current Outpatient Medications:     ADVAIR HFA 45-21 mcg/actuation HFAA inhaler, Inhale into the lungs., Disp: , Rfl:     albuterol (PROVENTIL/VENTOLIN HFA) 90 mcg/actuation inhaler, Inhale into the lungs., Disp: , Rfl:     loratadine (CLARITIN) 10 mg tablet, Take 10 mg by mouth., Disp: , Rfl:     clotrimazole (LOTRIMIN) 1 % cream, Apply topically 2 (two) times daily. (Patient not taking: Reported on 10/7/2024), Disp: 12 g, Rfl: 0    ketoconazole (NIZORAL) 2 % cream, Apply topically once daily.  "(Patient not taking: Reported on 10/7/2024), Disp: 15 g, Rfl: 2    mupirocin (BACTROBAN) 2 % ointment, Apply topically 3 (three) times daily. (Patient not taking: Reported on 10/7/2024), Disp: 22 g, Rfl: 2    omeprazole (PRILOSEC) 20 MG capsule, Take 20 mg by mouth. (Patient not taking: Reported on 10/7/2024), Disp: , Rfl:     pantoprazole (PROTONIX) 20 MG tablet, Take 20 mg by mouth. (Patient not taking: Reported on 10/7/2024), Disp: , Rfl:     Review of Systems   Constitutional:  Positive for fatigue and unexpected weight change.   HENT:  Negative for trouble swallowing.    Eyes:  Positive for photophobia. Negative for pain and visual disturbance.   Respiratory:  Positive for shortness of breath. Negative for cough.    Cardiovascular:  Negative for chest pain and leg swelling.   Gastrointestinal:  Positive for constipation and nausea. Negative for diarrhea, vomiting and reflux.   Genitourinary:  Positive for menstrual irregularity. Negative for bladder incontinence, frequency, menstrual problem and urgency.   Musculoskeletal:  Negative for gait problem.   Integumentary:  Negative for rash.        Acne   Neurological:  Positive for headaches. Negative for dizziness.        Sensitive to sound with headache   Psychiatric/Behavioral:  Positive for sleep disturbance. Negative for dysphoric mood. The patient is not nervous/anxious.         "mood swings"           Objective:      Vitals:    10/07/24 0805   BP: 110/70   Pulse: 62   SpO2: 98%   Weight: 73.9 kg (163 lb)   Height: 5' 4" (1.626 m)     Physical Exam  Vitals and nursing note reviewed.   Constitutional:       General: She is not in acute distress.     Appearance: Normal appearance. She is well-developed. She is not ill-appearing.   HENT:      Head: Normocephalic and atraumatic.      Right Ear: External ear normal.      Left Ear: External ear normal.      Nose: Nose normal.      Mouth/Throat:      Lips: Pink.      Pharynx: Oropharynx is clear.   Eyes:      " General: No scleral icterus.     Pupils: Pupils are equal, round, and reactive to light.   Neck:      Thyroid: No thyromegaly.      Vascular: No carotid bruit.   Cardiovascular:      Rate and Rhythm: Normal rate and regular rhythm.      Pulses:           Radial pulses are 2+ on the right side and 2+ on the left side.      Heart sounds: Normal heart sounds. No murmur heard.  Pulmonary:      Effort: Pulmonary effort is normal.      Breath sounds: Normal breath sounds.   Abdominal:      General: Bowel sounds are normal.      Palpations: Abdomen is soft.      Tenderness: There is no abdominal tenderness.   Musculoskeletal:         General: Normal range of motion.      Cervical back: Normal range of motion.      Lumbar back: Normal. No spasms.      Right lower leg: No edema.      Left lower leg: No edema.   Skin:     General: Skin is warm and dry.      Capillary Refill: Capillary refill takes less than 2 seconds.   Neurological:      General: No focal deficit present.      Mental Status: She is alert and oriented to person, place, and time.      Cranial Nerves: No cranial nerve deficit.      Sensory: Sensation is intact.      Motor: No weakness.      Gait: Gait is intact.   Psychiatric:         Attention and Perception: Attention normal.         Mood and Affect: Mood normal.         Speech: Speech normal.         Behavior: Behavior is cooperative.         Thought Content: Thought content does not include homicidal or suicidal ideation.           Assessment:       1. Intractable migraine without status migrainosus, unspecified migraine type    2. Psychophysiological insomnia    3. Mild anxiety    4. Fatigue, unspecified type    5. Labile mood    6. Weight gain    7. Acne, unspecified acne type    8. Bronchial atresia    9. History of reactive airway disease    10. Family history of lung cancer    11. Chronic idiopathic constipation    12. Family history of colon cancer    13. Irregular menstrual cycle    14. Encounter for  "cervical Pap smear with pelvic exam         Plan:       Intractable migraine without status migrainosus, unspecified migraine type  Does not want any medications but is interested in alternative treatments like acupuncture, possibly Botox.  -     Ambulatory referral/consult to Neurology; Future; Expected date: 10/14/2024    Psychophysiological insomnia  Patient states she is unable to sleep when her  is on deployment.  She does not want any medications to help her sleep.  She is going to look into possible yoga and/or meditation.  She is also interested in seeking counseling.  List of resources was provided today.    Mild anxiety  Mostly centered around when her  is away with the  on deployment and she is the sole parent of their 11-year-old son  I did provide her with information today about buspirone as a possible treatment if she is interested.    Fatigue, unspecified type  Labile mood  Weight gain  Acne, unspecified acne type  Feels these are all signs and symptoms of Abbi menopausal hormonal changes.  Referral to Danna Martell CNM today to establish care with gynecological provider, hormone optimization    Bronchial atresia  History of reactive airway disease  Family history of lung cancer  Referral to Dr. Kaelyn Fonseca today.  Patient prefers to see a female providers.  -     Ambulatory referral/consult to Pulmonology; Future; Expected date: 10/14/2024    Chronic idiopathic constipation  Family history of colon cancer  Patient suffers with chronic constipation.  She also has a family history of colon cancer.  Referral to Dr. Nash today.  Patient prefers to see a female providers.  -     Ambulatory referral/consult to Gastroenterology; Future; Expected date: 10/14/2024    Irregular menstrual cycle  Encounter for cervical Pap smear with pelvic exam  Periods have become irregular but patient is still having periods.  She does feel like she is Abbi menopausal."  She has what she feels " are hormone related changes.  She also needs to get established with gynecology for routine care.  -     Ambulatory referral/consult to Obstetrics / Gynecology; Future; Expected date: 10/14/2024      Follow up in about 3 months (around 1/7/2025), or if symptoms worsen or fail to improve.        10/7/2024 Kaelyn Patsrana

## 2024-10-07 NOTE — PATIENT INSTRUCTIONS
Vitafusion gummy melatonin over the counter. I would start with a low dose, like 3mg, max 5mg for now.    Phil Nieves,     If you are due for any health screening(s) below please notify me so we can arrange them to be ordered and scheduled. Most healthy patients at your age complete them, but you are free to accept or refuse.     If you can't do it, I'll definitely understand. If you can, I'd certainly appreciate it!    Tests to Keep You Healthy    Mammogram: SCHEDULED  Cervical Cancer Screening: DUE      Schedule your breast cancer screening today     Breast cancer is the second most common cancer in women,  and the second leading cause of death from cancer. Mammograms can detect breast cancer early, which significantly increases the chances of curing the cancer.       Our records indicate that you may be overdue for breast cancer screening. Cancer screenings save lives, so schedule yours today to stay healthy.     If you recently had a mammogram performed outside of [facility], please let your Health care team know so that they can update your health record.        Your cervical cancer screening is due     Our records indicate that you may be overdue for your screening Pap smear. A Pap smear is an important health screening that can detect abnormal cells that can become cervical cancer. Cervical cancer screenings allow for early diagnosis and increase the likelihood of successful treatment.     The current recommendation for Pap smear screening is every 3-5 years for women at average risk. We encourage you to schedule your appointment with your womens health provider. Many women see a gynecologist for this screening, but some primary care providers also provide Pap screening.     If you recently had your Pap smear screening performed outside of [facility], please let your health care team know so that they can update your health record.                We are going to fax referral to Danna  FERNANDO Martell. Call [phone] to schedule appt with her. (GYN)      Sleep Hygiene Tips     1. Maintain a regular wake time, even on days off work and on weekends.     2. Try to go to bed only when you are drowsy.     3. If you aren't drowsy and are unable to fall asleep for about 20 minutes, leave your bedroom and engage in a quiet activity elsewhere. Do not permit yourself to fall asleep outside the bedroom. Return to bed when, and only when, you are sleepy.     4. Use your bedroom only for sleep, intimacy and times of illness.     5. Almost everyone experiences an occasional night of lost or disturbed sleep. It is a natural, perhaps adaptive, response to acute stress.     6. Avoid napping during the daytime. If you nap, try to do so the same time every day and for no more than one hour.     7. Establish relaxing pre-sleep rituals such as a warm bath, light bedtime snack or 10 minutes of reading.     8. Exercise regularly and confine vigorous exercise to early hours, at least six hours before bedtime, and mild exercise to at least four hours prior to bedtime.     9. Keep a regular schedule. Regular time for meals, medications, chores and other activities help keep the inner clock running smoothly.     10. Hunger may disturb sleep. A light snack, especially warm milk, seems to help people get to sleep. Avoid large meals just prior to bedtime.     11. Avoid ingestion of caffeine within six hours of bedtime, this includes coffee, tea and soda.     12. Don't drink alcohol when sleepy. Even a small dose of alcohol can have a potent effect when combined with tiredness.     13. Avoid the use of nicotine close to bedtime or during the night.     14. Do not drink alcohol while taking sleeping pills or other medication.     15. Occasional loud noises from aircraft, streets or highways disturb sleep even in people who do not awaken and who cannot remember the noise in the morning. These sleep disturbances can reduce  restful sleep. People who sleep near noise should try heavy curtains in their bedrooms, ear plugs or white noise machines to protect the amount of restful sleep they get.     16. Do not sleep with the television or other flickering lights on in your bedroom    [facility]  [place]  [address]  [place] [address]  P: [phone] OPT 3    [facility]  [place]  [address] [facility] [address]  [place] [address]  P:[phone] OPT 2     ? [facility]  [place]  [address]  [place]  P: [phone] OPT 1    ? [facility] - [place]  [address] [place] [address]  P:[phone]     ? [facility] CHILD  DEVELOPMENT  [address]  [place] [address]  P: [phone]    ? [facility]  [place]  [address] [place] [address]  P:[phone]    COMMUNITY RESOURCES:  [place] / [place]  ? [facility]  [address] [place] [address], [address]  P:[phone] [phone]  Accepts all insurances  except: [place] Healthcare  Connections    ? [facility]  [address]  [place] [address]  P:[phone] [phone]  IP: MEDICAID,  MEDICARE, COMMERCIAL INSURANCE    ? [facility]  [address]  [place] [address]  P:[phone]  Psychoeducational,  autism, and ADHD evals  Accepts Medicare and  Comm ins    ? [facility]al *  [address]  [place] [address]  P:[phone]  Accepts some insurances  Sliding scale    ? [facility]  [address]  [place] [address]  P:[phone]    ? [facility]  [address] [place] [address]  P:[phone]  Accepts Medicaid,  Medicare, Comm    ? [facility]  [address] [address]  P:[phone]  IP: Medicare/  Comm/Cash pay    ? [facility]  [address]  [place] [address]  P: [phone]  Accepts Comm, Sliding  Scale    ? [facility]  [address]  [place] [address]  P:[phone]  Accepts Comm only    ? [facility]  [address] [place] [address]  P:[phone]  Accepts Medicaid and  most Comm  Must do intake    ? [facility]  [address] [place] [address]  P:[phone]  Accepts all 5 Medicaid  and Comm    ? Mynor Avilez Jr., MD  [address]  [place] [address]  P:[phone]  Takes [place] Medicaid    ? Jorge Curiel, PhD  [address]   [address]  [place] [address]  P:[phone]    ? Tanner Curiel, PhD  [address] [address]  P:[phone]  Takes [place] Medicaid  2    [place]  ? [facility]  [address]  [place] [address]  P:[phone]  Accepts all 5 Medicaid    ? [facility] *  [address] [place] [address]  P:[phone]  F:[phone]  Accepts all insurances  except: [place] Healthcare  Connections    ? [facility] *  [address] [place] [address]  P:[phone]  F:[phone]  IOP: AmeriHealth  Medicaid, Medicare,  some Comm    ? [facility]  [address],  [place] [address]  P:[phone]  Accepts Comm Only  Pt must call for intake  Adults: ADHD/ADD only  Children: Various  Disorders    ? [facility]  [address] [place] [address]  [phone]  Accepts Medicaid Only    ? [facility] *  [address] [place] [address]  P:[phone]  Accepts some insurances  Sliding scale    ? [facility]  [address] [place] [address]  P:[phone] F: [phone] (records)  Accepts Medicaid Only    ? [facility] *  [address]  [place] [address]  P:[phone]  Cash pay only    ? [facility]  [address]  [place] [address]  P:[phone]  Couples/marriage  counseling,  family, kids,  adults  Accepts some insurance    ? [facility]  [address]  [place] [address]  P:[phone]  F:[phone]  Cash pay / sliding scale    ? [facility]  [address] [place], [address]  P: [phone]    ADULTS ONLY    ? [facility]  [address]  [place] [address]  P:[phone]    ? [facility]  [address]  [place] [address]  [phone]  Children, Adults, Family  & Group Counseling  [place] Medicaid    [facility], [facility]  [address]  [phone]    ARMIN COOK, [facility]  [address]  [phone]    ENDY HALL, [facility]  [address]  [phone]    [facility]  [address] [facility] [address]  [phone]    JEANETTE SILVERMAN PHD, PSYCHOLOGIST  [address]   [phone]    ALESHA CUELLAR, [facility]  [address] [facility]   [phone]      [place]    ? [facility]  [place]  [address] [place] [address]  [phone]  F; [phone]  Accepts most ins, and  [place]. Adult,  child, and family services    ? [facility],  [facility]  [address] [place] [address]  P: [phone]  Accepts some  commercial insurance  and [place] Medicaid. Adults,  children, and families    ? [facility], [facility]  Roberta Adame, [facility]  [address] [place] [address]  P: [phone]  Accepts some ins and [place]  Medicaid    ? Sharron Duncan PSyD  [address],  [place] [address]  [phone]  Adults only  ? [place]  [address]  [place] [address]  P: [phone]  Adults and children  medication management    ? [facility]  [address] [place] [address], [place] [address]  P: [phone]  Adults and children  Therapy only  3  [place] (CONT.)  ? [facility]  Behavioral health  [address] [place] [address]. [address] CARROLL  [place] [address]  P: [phone]  Adults, Children,  medication management,  individual and family  counseling sessions  ? [facility]  Dr. Jesse Villela  [address]  [address]  [place] [address]  P: [phone]  ? [facility]  [address] [place] [address]  P: [phone]  F: [phone]  Does not take ,  sees adults and children,  medication management,  therapy, and testing    ? [facility]  [address] [place] [address]  P: [phone] or  [phone]  Adults, children, med  management, therapy,  marital and family  counseling, IOP    ? [facility]  [address] [place] [address]  P: [phone] (allow  48 business hours for  reply) Adults, children,  med management,  therapy, EMDR, CBT  KAREN LOYA [facility]    ? [facility]  [address]  [place] [address]  P: [phone]  ? [facility]  [address]  [place] [address]  P: [phone]  ? [facility] 3rd  floor  [address]  [place] [address]  P: [phone]  ? [facility]  [address]  [place] [address]  P: [phone]  ? [facility]  - [facility]  is on the 2nd floor  [address] [facility]  [place] [address]  P: [phone]  VIRTUAL OPTIONS  ? [facility]  [email or web address]  therapy only,  adolescents, adults,  marriage /couples  counseling, 45 minute  virtual sessions at $85  each  ? [facility]  [facility]  [email or web address]  [email or web address]  Free for 90 days to  residents of [place], [facility], [facility]  P: [phone]  Email:  [email or web address]      [facility] Matt Johnson, LCSW    [phone]    Noelle Daugherty, [facility]   [phone].       Fernanda Hardwick, [facility]   [email or web address]   Office:  [address] [address]   [place] [address]   Phone / Fax   Phone: [phone]   Email   [email or web address]       Loretta Cristobal, [facility]   [address]   [address]   [place] [address]   [phone]    Marisol Red, [facility]   Specializes in eating disorders, depression, anxiety-females only   [address] [place] [address]    [phone]         Debbie Choi , PhD   [email or web address]   [phone]   [address] [place], [place] [address]      Nancie Al, PhD   [phone]   [address] [place] [address]      Mandie Barger, [facility]   [phone]   [address] [place] [address]      Marcia Mills [facility]   [email or web address]   [phone]   [address] [place] [address]           Constipation Counseling  contributing factors to constipation: not enough water, low fiber diet and lack of exercise.  Recommend daily exercise as tolerated, adequate water intake (eight 8-oz glasses of water daily), and high fiber diet. OTC fiber supplements are recommended if diet does not reach daily fiber goal (20-30 grams daily), such as Metamucil, Citrucel, or FiberCon (take as directed, separate from other oral medications by >2 hours). Fiber can be taken in pill form, chewable form, or powder form, based on your preference.  Stool softeners can be used for prevention of constipation but will not typically relieve acute constipation alone. Stool softeners include docusate sodium (Colace), Sennakot, and Miralax.  Laxatives can be used to relieve acute constipation. Stimulant laxatives should not be used daily. This can cause your digestive system to become dependent upon these medications to produce a bowel movement. Laxatives that contain stimulants  include Dulcolax, (both oral and rectal suppository), Ex-Lax, Correctol, etc.   Non-stimulant laxatives include Milk of Magnesia, and glycerin suppositories.      GUMMY FIBER

## 2024-10-11 ENCOUNTER — TELEPHONE (OUTPATIENT)
Dept: FAMILY MEDICINE | Facility: CLINIC | Age: 44
End: 2024-10-11
Payer: OTHER GOVERNMENT

## 2024-10-11 ENCOUNTER — HOSPITAL ENCOUNTER (OUTPATIENT)
Dept: RADIOLOGY | Facility: HOSPITAL | Age: 44
Discharge: HOME | End: 2024-10-11
Payer: OTHER GOVERNMENT

## 2024-10-11 VITALS — BODY MASS INDEX: 27.83 KG/M2 | WEIGHT: 163 LBS | HEIGHT: 64 IN

## 2024-10-11 DIAGNOSIS — Z12.31 OTHER SCREENING MAMMOGRAM: ICD-10-CM

## 2024-10-11 PROCEDURE — 77063 BREAST TOMOSYNTHESIS BI: CPT | Mod: TC,PO

## 2024-10-11 PROCEDURE — 77067 SCR MAMMO BI INCL CAD: CPT | Mod: 26,,, | Performed by: RADIOLOGY

## 2024-10-11 PROCEDURE — 77067 SCR MAMMO BI INCL CAD: CPT | Mod: TC,PO

## 2024-10-11 PROCEDURE — 77063 BREAST TOMOSYNTHESIS BI: CPT | Mod: 26,,, | Performed by: RADIOLOGY

## 2024-10-11 NOTE — TELEPHONE ENCOUNTER
----- Message from LADONNA Marcano sent at 10/11/2024  9:16 AM CDT -----  Normal mammogram. Repeat annually.

## 2024-10-17 ENCOUNTER — OFFICE VISIT (OUTPATIENT)
Dept: NEUROLOGY | Facility: CLINIC | Age: 44
End: 2024-10-17
Payer: OTHER GOVERNMENT

## 2024-10-17 VITALS
HEIGHT: 64 IN | DIASTOLIC BLOOD PRESSURE: 76 MMHG | WEIGHT: 162.56 LBS | SYSTOLIC BLOOD PRESSURE: 121 MMHG | HEART RATE: 75 BPM | RESPIRATION RATE: 17 BRPM | TEMPERATURE: 98 F | BODY MASS INDEX: 27.75 KG/M2

## 2024-10-17 DIAGNOSIS — G44.40 MEDICATION OVERUSE HEADACHE: ICD-10-CM

## 2024-10-17 DIAGNOSIS — F45.8 BRUXISM: ICD-10-CM

## 2024-10-17 DIAGNOSIS — G43.709 CHRONIC MIGRAINE WITHOUT AURA WITHOUT STATUS MIGRAINOSUS, NOT INTRACTABLE: ICD-10-CM

## 2024-10-17 DIAGNOSIS — M79.18 CERVICAL MYOFASCIAL PAIN SYNDROME: ICD-10-CM

## 2024-10-17 DIAGNOSIS — G43.919 INTRACTABLE MIGRAINE WITHOUT STATUS MIGRAINOSUS, UNSPECIFIED MIGRAINE TYPE: ICD-10-CM

## 2024-10-17 DIAGNOSIS — G43.709 CHRONIC MIGRAINE W/O AURA W/O STATUS MIGRAINOSUS, NOT INTRACTABLE: Primary | ICD-10-CM

## 2024-10-17 PROCEDURE — 99999 PR PBB SHADOW E&M-EST. PATIENT-LVL V: CPT | Mod: PBBFAC,,, | Performed by: NURSE PRACTITIONER

## 2024-10-17 PROCEDURE — 99215 OFFICE O/P EST HI 40 MIN: CPT | Mod: PBBFAC,PO | Performed by: NURSE PRACTITIONER

## 2024-10-17 PROCEDURE — 99204 OFFICE O/P NEW MOD 45 MIN: CPT | Mod: S$PBB,,, | Performed by: NURSE PRACTITIONER

## 2024-10-17 RX ORDER — LANOLIN ALCOHOL/MO/W.PET/CERES
400 CREAM (GRAM) TOPICAL 2 TIMES DAILY
Qty: 60 TABLET | Refills: 12 | Status: SHIPPED | OUTPATIENT
Start: 2024-10-17

## 2024-10-17 NOTE — PROGRESS NOTES
Date of service: 10/17/2024  Referring provider: Kaelyn Pastrana    Subjective:      Chief complaint: Headache       Patient ID: Lizbeth Berry is a 44 y.o. female with asthma, constipation, fatigue, GERD, anxiety who presents for new patient evaluation of headache     History of Present Illness    ORIGINAL HEADACHE HISTORY - 10/17/24  Age at onset and course over time: early 2010's began with headaches. She recalls a severe migraine in 2016 that lasted three years. Acupuncture broke that cycle. Headaches returned around 2019. She moved from [place] to [place] last year and headaches have worsened. She typically takes ibuprofen and headaches go away.   Her  deployed one month ago and she had poor sleep hygiene. Headaches have worsened since that time.  Family history of headaches - sister had menstrual migraines  Last eye exam - 2023. Wears glasses for computer screen use and nighttime driving.   She works for [employer] remotely. She states this is a stressful job and she is on the computer all day.   She does not like to take medication.    Location: front of head, back of neck  Quality:  [x] pressure [] tight [x] throbbing [] sharp [x] stabbing   Severity: current 3 with range 3-10  Duration: hours  Frequency: daily  Headaches awaken at night?: yes   Worst time of day: mid-day  Associated with: [x] photophobia [x]  phonophobia [] osmophobia [] blurred vision  [] double vision [] loss of appetite [x] nausea [] vomiting [] dizziness [] vertigo  [] tinnitus [x] irritability [] sinus pressure [x] problems with concentration   [x] neck tightness   Alleviated by:  [] sleep [] darkness [] massage [] heat [] ice [] medication  Exacerbated by:  [x] fatigue [] light [] noise [] smells [] coughing [] sneezing  [] bending over [x] ovulation [x] menses [] alcohol [x] change in weather [x]  stress  Ipsilateral autonomic: [] nasal congestion [] lacrimation [] ptosis [] injection [] edema [] foreign body sensation [] ear  fullness   ICP:  [] transient visual obscurations  [] tinnitus   [] positional headache  [x] non-positional   Sleep habits: trouble falling asleep, trouble staying asleep, unrefreshing sleep, snoring?  Caffeine intake: 3 cups  Gyn status (if female): having periods  HIT 6: 66    Current acute treatment:  Advil - daily when needed    Current prevention:  None  ([place])  (Multivitamin)    Previously tried/failed acute treatment:  Excedrin  Ibuprofen    Previously tried/failed preventative treatment:  None      Review of patient's allergies indicates:  No Known Allergies  Current Outpatient Medications   Medication Sig Dispense Refill    ADVAIR HFA 45-21 mcg/actuation HFAA inhaler Inhale into the lungs.      albuterol (PROVENTIL/VENTOLIN HFA) 90 mcg/actuation inhaler Inhale into the lungs.      clotrimazole (LOTRIMIN) 1 % cream Apply topically 2 (two) times daily. (Patient not taking: Reported on 10/17/2024) 12 g 0    digital therapeutic,NOE device Misc 1 each by Misc.(Non-Drug; Combo Route) route daily as needed (migraine). 1 each 11    ketoconazole (NIZORAL) 2 % cream Apply topically once daily. (Patient not taking: Reported on 10/17/2024) 15 g 2    loratadine (CLARITIN) 10 mg tablet Take 10 mg by mouth. (Patient not taking: Reported on 10/17/2024)      magnesium oxide (MAG-OX) 400 mg (241.3 mg magnesium) tablet Take 1 tablet (400 mg total) by mouth 2 (two) times daily. 60 tablet 12    mupirocin (BACTROBAN) 2 % ointment Apply topically 3 (three) times daily. (Patient not taking: Reported on 10/17/2024) 22 g 2    omeprazole (PRILOSEC) 20 MG capsule Take 20 mg by mouth. (Patient not taking: Reported on 10/17/2024)      pantoprazole (PROTONIX) 20 MG tablet Take 20 mg by mouth. (Patient not taking: Reported on 10/17/2024)       No current facility-administered medications for this visit.       Past Medical History  Past Medical History:   Diagnosis Date    Bronchial atresia        Past Surgical History  History  reviewed. No pertinent surgical history.    Family History  No family history on file.    Social History  Social History     Socioeconomic History    Marital status:    Tobacco Use    Smoking status: Never    Smokeless tobacco: Never   Substance and Sexual Activity    Alcohol use: Yes     Alcohol/week: 2.0 standard drinks of alcohol     Types: 2 Glasses of wine per week     Social Drivers of Health     Financial Resource Strain: Low Risk  (10/17/2024)    Overall Financial Resource Strain (CARDIA)     Difficulty of Paying Living Expenses: Not hard at all   Food Insecurity: No Food Insecurity (10/17/2024)    Hunger Vital Sign     Worried About Running Out of Food in the Last Year: Never true     Ran Out of Food in the Last Year: Never true   Physical Activity: Insufficiently Active (10/17/2024)    Exercise Vital Sign     Days of Exercise per Week: 3 days     Minutes of Exercise per Session: 30 min   Stress: Stress Concern Present (10/17/2024)    [employer] - Occupational Stress Questionnaire     Feeling of Stress : Very much   Housing Stability: Unknown (10/17/2024)    Housing Stability Vital Sign     Unable to Pay for Housing in the Last Year: No        Review of Systems  14-point review of systems as follows:   No check deirdre indicates NEGATIVE response   Constitutional: [] weight loss, [] change to appetite   Eyes: [] change in vision, [] double vision   Ears, nose, mouth, throat: [] frequent nose bleeds, [] ringing in the ears   Respiratory: [] cough, [] wheezing   Cardiovascular: [] chest pain, [] palpitations   Gastrointestinal: [] jaundice, [] nausea/vomiting   Genitourinary: [] incontinence, [] burning with urination   Hematologic/lymphatic: [] easy bruising/bleeding, [] night sweats   Neurological: [] numbness, [] weakness   Endocrine: [x] fatigue, [] heat/cold intolerance   Allergy/Immunologic: [] fevers, [] chills   Musculoskeletal: [] muscle pain, [] joint pain    Psychiatric: [] thoughts of harming self/others, [] depression   Integumentary: [] rashes, [] sores that do not heal     Objective:        Vitals:    10/17/24 1053   BP: 121/76   Pulse: 75   Resp: 17   Temp: 97.6 °F (36.4 °C)     Body mass index is 27.91 kg/m².    10/17/24  Constitutional: appears in no acute distress, well-developed, well-nourished     Eyes: normal conjunctiva, PERRLA    Ears, nose, mouth, throat: external appearance of ears and nose normal, hearing intact. Tongue scalloping      Cardiovascular: regular rate and rhythm, no murmurs appreciated    Respiratory: unlabored respirations, breath sounds normal bilaterally    Gastrointestinal: no visible abdominal masses, no guarding, no visible hernia    Musculoskeletal: normal tone in all four extremities. No abnormal movements. No pronator drift. No orbit. Symmetric finger tapping. Normal station. Normal regular gait. Normal tandem gait.      Spine:   CERVICAL SPINE:  ROM: normal   MUSCLE SPASM: in all planes    FACET LOADING: right    SPURLING: no  JEREMIAH / MARCIANO tender: no     Psychiatric: normal judgment and insight. Oriented to person, place, and time.     Neurologic:   Cortical functions: recent and remote memory intact, normal attention span and concentration, speech fluent, adequate fund of knowledge   Cranial nerves: visual fields full, PERRLA, EOMI, symmetric facial strength, hearing intact, palate elevates symmetrically, shoulder shrug 5/5, tongue protrudes midline   Reflexes: 2+ in the upper and lower extremities, no Martinez  Sensation: intact to temperature throughout   Coordination: normal finger to nose, heel to shin    Data Review:     I have personally reviewed the referring provider's notes, labs, & imaging made available to me today.      RADIOLOGY STUDIES:  I have personally reviewed the pertinent images performed.       No results found for this or any previous visit.    Lab Results   Component Value Date     10/02/2024    K 4.0  "10/02/2024     10/02/2024    CO2 18 (L) 10/02/2024    BUN 13 10/02/2024    CREATININE 0.73 10/02/2024    GLU 55 (L) 10/02/2024    AST 19 10/02/2024    ALT 16 10/02/2024    ALBUMIN 4.8 10/02/2024    PROT 7.9 10/02/2024    BILITOT 1.0 10/02/2024    CHOL 222 (H) 10/02/2024    HDL 67 10/02/2024    LDLCALC 138 (H) 10/02/2024    TRIG 73 10/02/2024       Lab Results   Component Value Date    WBC 6.6 10/02/2024    HGB 15.1 10/02/2024    HCT 46.1 (H) 10/02/2024    MCV 90.7 10/02/2024     10/02/2024       No results found for: "TSH"        Assessment & Plan:       Problem List Items Addressed This Visit          Neuro    Chronic migraine without aura without status migrainosus, not intractable    Overview     Migraine headaches since early 2010's. Now daily. Could be medication overuse or increased stress. She wishes to try conservative non-pharmacologic treatment first. Add magnesium and PT for prevention. For acute attacks will trial Nerivio device. Could also try Cephaly.           Other Visit Diagnoses       Chronic migraine w/o aura w/o status migrainosus, not intractable    -  Primary    Relevant Medications    magnesium oxide (MAG-OX) 400 mg (241.3 mg magnesium) tablet    digital therapeutic,NOE device Misc    Other Relevant Orders    Ambulatory referral/consult to Physical/Occupational Therapy    Cervical myofascial pain syndrome        Relevant Orders    Ambulatory referral/consult to Physical/Occupational Therapy    Bruxism        Relevant Orders    Ambulatory referral/consult to Physical/Occupational Therapy    Medication overuse headache        Near daily ibuprofen. Discussed need to reduce use as this could be contributing to daily headache                Please call our clinic at [phone] or send a message on the [facility] portal if there are any changes to the plan described below, for example,if you are not contacted for the requested tests, referral(s) within one week, if you are unable to " receive the medications prescribed, or if you feel you need to change the treatment course for any reason.     TESTING:  -- none    REFERRALS:  -- physical therapy    PREVENTION (use daily regardless of headache):  -- start magnesium in ONE of the following preparations -               1. Magnesium oxide 800mg daily (the most common over the counter kind, may causes loose stools)              2. Magnesium citrate 400-500mg daily (harder to find, but more neutral on the bowels)              3. Magnesium glycinate 400mg daily (hardest to find, look online, but most bowel-neutral, best absorbed)   -- can also add B2 and coQ10 enzyme    AS-NEEDED TREATMENT (use total no more than 10 days per month unless otherwise stated):  -- will order Nerivio device for acute treatment     Follow up in about 3 months (around 1/17/2025).    Radha Zaragoza NP

## 2024-10-17 NOTE — PATIENT INSTRUCTIONS
Please call our clinic at [phone] or send a message on the [facility] portal if there are any changes to the plan described below, for example,if you are not contacted for the requested tests, referral(s) within one week, if you are unable to receive the medications prescribed, or if you feel you need to change the treatment course for any reason.     TESTING:  -- none    REFERRALS:  -- physical therapy    PREVENTION (use daily regardless of headache):  -- start magnesium in ONE of the following preparations -               1. Magnesium oxide 800mg daily (the most common over the counter kind, may causes loose stools)              2. Magnesium citrate 400-500mg daily (harder to find, but more neutral on the bowels)              3. Magnesium glycinate 400mg daily (hardest to find, look online, but most bowel-neutral, best absorbed)   -- can also add B2 and coQ10 enzyme    AS-NEEDED TREATMENT (use total no more than 10 days per month unless otherwise stated):  -- will order Nerivio device for acute treatment

## 2024-10-24 ENCOUNTER — CLINICAL SUPPORT (OUTPATIENT)
Dept: REHABILITATION | Facility: HOSPITAL | Age: 44
End: 2024-10-24
Payer: OTHER GOVERNMENT

## 2024-10-24 DIAGNOSIS — M79.18 CERVICAL MYOFASCIAL PAIN SYNDROME: ICD-10-CM

## 2024-10-24 DIAGNOSIS — G43.709 CHRONIC MIGRAINE W/O AURA W/O STATUS MIGRAINOSUS, NOT INTRACTABLE: ICD-10-CM

## 2024-10-24 DIAGNOSIS — F45.8 BRUXISM: ICD-10-CM

## 2024-10-24 DIAGNOSIS — M53.82 IMPAIRED RANGE OF MOTION OF CERVICAL SPINE: Primary | ICD-10-CM

## 2024-10-24 PROCEDURE — 97140 MANUAL THERAPY 1/> REGIONS: CPT | Mod: PN

## 2024-10-24 PROCEDURE — 97161 PT EVAL LOW COMPLEX 20 MIN: CPT | Mod: PN

## 2024-10-24 NOTE — PLAN OF CARE
[facility]  Physical Therapy Initial Evaluation    Name: Lizbeth Berry  Clinic Number: [id number]    Therapy Diagnosis:   Encounter Diagnoses   Name Primary?    Chronic migraine w/o aura w/o status migrainosus, not intractable     Cervical myofascial pain syndrome     Bruxism     Impaired range of motion of cervical spine Yes      Physician: Radha Zaragoza NP    Physician Orders: Eval and Treat   Medical Diagnosis from Referral:   G43.709 (ICD-10-CM) - Chronic migraine w/o aura w/o status migrainosus, not intractable   M79.18 (ICD-10-CM) - Cervical myofascial pain syndrome   F45.8 (ICD-10-CM) - Bruxism     Evaluation Date: 10/24/2024  Authorization Period Expiration: 10/24/25  Plan of Care Expiration: 12/15/24    Progress Update: 11/24/24    Visit # / Visits authorized: 1 / 8    FOTO: Visit #1 - 1/3     PRECAUTIONS: Standard Precautions     MD Follow-up: /2023    Time In: 500  Time Out: 550  Total Appointment Time (timed & untimed codes): 50 minutes    SUBJECTIVE     Date of onset: years    History of current condition - Lizbeth is a 44 y.o. female whom reports chronic headaches for years.  She has had acupuncture in the past that did help her.  Currently she has difficulty with daily tasks due to headaches .  Wants to do Dry Needle to help with headaches . Lizbeth's current exercise regiment includes: none .  Seeking Physical Therapy for less headaches .    SONIA: gradual   Falls: none   Physician Instructions (per patient): none   Other concerns:     Imaging: No updated imaging for this episode of care:     Prior Therapy: N/A  Social History: Pt lives with their spouse  Living Environment:   ADLs unable to complete: none   Gym/Home Equipment: none   Occupation: Pt is Seated at computer   Prior Level of Function: Independent with all ADLs  Current Level of Function: Independent     Pain:  Current 5 /10, worst 9 /10, best 3 /10   Location: cervical   Description: Aching, Tight, and  Sharp  Aggravating Factors:   Easing Factors: none     Pts goals: Pt reported goals are less headaches     _______________________________________________________  Medical History:   Past Medical History:   Diagnosis Date    Bronchial atresia        Surgical History:   Lizbeth Berry  has no past surgical history on file.    Medications:   Lizbeth has a current medication list which includes the following prescription(s): advair hfa, albuterol, clotrimazole, digital therapeutic,quentin device, ketoconazole, loratadine, magnesium oxide, mupirocin, omeprazole, and pantoprazole.    Allergies:   Review of patient's allergies indicates:  No Known Allergies     OBJECTIVE     RANGE OF MOTION:    Cervical Right  (spine) Left     Pain/Dysfunction with Movement Goal   Cervical Flexion (60) 50   45  Initial:    Cervical Extension (90) 55   45  Initial:    Cervical Side Bending (45) 20 25  45  Initial:    Cervical Rotation (75) 60 60  60  Initial:        NEURO SCREEN:    Neuro Testing Right   Left   Details   Reflexes  Not tested  Not tested     Dermatomes normal normal    Myotomes normal normal    Tone normal normal    Spasticity Not present Not present        FUNCTIONAL SCREEN:    Upper Extremity ROM Details STRENGTH Details   Shoulder wnl No pain or discomfort Grossly 4/5 No pain or discomfort   Elbow  No Pain or discomfort Grossly 6/5 No pain or discomfort   Hand/Wrist  No pain or discomfort Grossly 5/5 No pain or discomfort       JOINT MOBILITY:     Joint Motion Tested Right  (spine)   Left    Goal   Cervical Mobility: C1-2 Hypomobile Hypomobile Normal B   Cervical Moiblity: C3-7 Normal Normal Normal B   Thoracic Mobility: T1-12 Normal Normal Normal B   SPECIAL TESTS:       Right  (spine)   Left    Goal   Compression Negative Negative Negative B    Distraction  Negative Negative Negative B    Spurlings  Negative Negative Negative B      Sensation:  Sensation is intact to light touch    Palpation: Increased tone and  tenderness noted with palpation to: cervical paraspinals     Posture:  Pt presents with postural abnormalities which include: Forward head     FUNCTION:     CMS Impairment/Limitation/Restriction for FOTO NDI Survey    Therapist reviewed FOTO scores for Lizbeth Berry on 10/24/2024.   FOTO documents entered into [employer] - see Media section.    Limitation Score: %         TREATMENT     Total Treatment time separate from Evaluation: (10) minutes    Lizbeth received the treatments listed below:      THERAPEUTIC EXERCISES: to develop strength, endurance, ROM, flexibility, posture and core stabilization for ()  minutes including: x = performed today    TherEx 10/24/2024    Dry Needle Cervical                      BOLD = new this visit  Plan for Next Visit:        PATIENT EDUCATION AND HOME EXERCISES     Education/Self-Care provided:  (included in treatment) minutes   Patient educated on the impairments noted above and the effects of physical therapy intervention to improve overall condition and QOL.   Patient was educated on all the above exercise prior/during/after for proper posture, positioning, and execution for safe performance with home exercise program.   Exercise/Activity modifications:   10/24/2024: EVAL:     Written Home Exercises Provided: yes. Prefers:   Exercises were reviewed and Lizbeth was able to demonstrate them prior to the end of the session.  Lizbeth demonstrated good understanding of the education provided. See EMR under Patient Instructions for exercises provided during therapy sessions.    ASSESSMENT     Lizbeth is a 44 y.o. female referred to outpatient Physical Therapy with a medical diagnosis of chronic headaches .  Lizbeth presents with clinical signs and symptoms that support this diagnosis with .     decreased Cervical ROM, decreased upper extremity strength, Cervical joint(s) hypomobility, upper extremity neural tension, and impaired functional mobility. Radicular symptoms are not  present..    decreased shoulder girdle ROM, decreased scapular and shoulder strength, Glenohumeral joint hypomobility, and impaired functional mobility.  The above impairments will be addressed through manual therapy techniques, therapeutic exercises, functional training, and modalities as necessary. Patient was treated and educated on exercises for home program, progression of therapy, and benefits of therapy to achieve full functional mobility.     Pt prognosis is Guarded.   Pt will benefit from skilled outpatient Physical Therapy to address the deficits stated above and in the chart below, provide pt/family education, and to maximize pt's level of independence.     Plan of care discussed with patient: Yes  Pt's spiritual, cultural and educational needs considered and patient is agreeable to the plan of care and goals as stated below:     Anticipated Barriers for therapy: chronicity of condition  Medical Necessity is demonstrated by the following  History  Co-morbidities and personal factors that may impact the plan of care [x] LOW: no personal factors / co-morbidities  [] MODERATE: 1-2 personal factors / co-morbidities  [] HIGH: 3+ personal factors / co-morbidities    Moderate / High Support Documentation:      Examination  Body Structures and Functions, activity limitations and participation restrictions that may impact the plan of care [x] LOW: addressing 1-2 elements  [] MODERATE: 3+ elements  [] HIGH: 4+ elements (please support below)    Moderate / High Support Documentation:      Clinical Presentation [x] LOW: stable  [] MODERATE: Evolving  [] HIGH: Unstable     Decision Making/ Complexity Score: low         GOALS:  SHORT TERM GOALS:  3 weeks  Progress Date met   Recent signs and systems trend is improving in order to progress towards LTG's. [] Met  [] Not Met  [x] Progressing     Patient will be independent with HEP in order to further progress and return to maximal function. [] Met  [] Not Met  [x]  Progressing     Pain rating at Worst: 5/10 in order to progress towards increased independence with activity. [] Met  [] Not Met  [x] Progressing     Patient will be able to correct postural deviations in sitting and standing, to decrease pain and promote postural awareness for injury prevention.  [] Met  [] Not Met  [x] Progressing      [] Met  [] Not Met  [] Progressing        LONG TERM GOALS: 6 weeks  Progress Date met   Patient will return to normal ADL, recreational, and work related activities with less pain and limitation.  [] Met  [] Not Met  [x] Progressing     Patient will improve AROM to stated goals in order to return to maximal functional potential.  [] Met  [] Not Met  [x] Progressing     Patient will improve Strength to stated goals of appropriate musculature in order to improve functional independence.  [] Met  [] Not Met  [x] Progressing     Pain Rating at Best: 1/10 to improve Quality of Life.  [] Met  [] Not Met  [x] Progressing     Patient will meet predicted functional outcome (FOTO) score: % to increase self-worth & perceived functional ability. [] Met  [] Not Met  [x] Progressing     Patient will have met/partially met personal goal of: less headaches  [] Met  [] Not Met  [x] Progressing      [] Met  [] Not Met  [] Progressing        PLAN   Plan of care Certification: 10/24/2024 to 12/15/24    Outpatient Physical Therapy 1 times weekly for 8 weeks to include any combination of the following interventions: virtual visits, dry needling, modalities, electrical stimulation (IFC, Pre-Mod, Attended with Functional Dry Needling), Manual Therapy, Moist Heat/ Ice, Neuromuscular Re-ed, Patient Education, Self Care, Therapeutic Exercise, Functional Training, and Therapeutic Activites     Thank you for this referral.    Jorge Mckee, PT      I CERTIFY THE NEED FOR THESE SERVICES FURNISHED UNDER THIS PLAN OF TREATMENT AND WHILE UNDER MY CARE   Physician's comments:     Physician's Signature:  ___________________________________________________

## 2024-10-25 ENCOUNTER — TELEPHONE (OUTPATIENT)
Dept: NEUROLOGY | Facility: CLINIC | Age: 44
End: 2024-10-25
Payer: OTHER GOVERNMENT

## 2024-10-25 NOTE — TELEPHONE ENCOUNTER
----- Message from Jorge sent at 10/25/2024  2:10 PM CDT -----  Type:  Needs Medical Advice    Who Called: Sachi amaro/ [facility]     Would the patient rather a call back or a response via [facility]? Call back    Best Call Back Number: [phone]     Additional Information: Sts they have been unable to reach pt about her the Nerivio device with the information they have and asked the office if they talk to the pt to notify her to contact them about the device.   Please advise -- Thank you

## 2024-11-14 ENCOUNTER — CLINICAL SUPPORT (OUTPATIENT)
Dept: REHABILITATION | Facility: HOSPITAL | Age: 44
End: 2024-11-14
Payer: OTHER GOVERNMENT

## 2024-11-14 DIAGNOSIS — M53.82 IMPAIRED RANGE OF MOTION OF CERVICAL SPINE: Primary | ICD-10-CM

## 2024-11-14 PROCEDURE — 97140 MANUAL THERAPY 1/> REGIONS: CPT | Mod: PN

## 2024-11-14 NOTE — PROGRESS NOTES
[facility]  Outpatient Physical Therapy Daily Treatment Note      Name: Lizbeth Berry  Clinic Number: [id number]  Visit Date: 11/14/2024    Therapy Diagnosis:   Encounter Diagnosis   Name Primary?    Impaired range of motion of cervical spine Yes       Physician: Radha Zaragoza NP  Physician Orders: Eval and Treat   Medical Diagnosis from Referral:   G43.709 (ICD-10-CM) - Chronic migraine w/o aura w/o status migrainosus, not intractable   M79.18 (ICD-10-CM) - Cervical myofascial pain syndrome   F45.8 (ICD-10-CM) - Bruxism      Evaluation Date: 10/24/2024  Authorization Period Expiration: 10/24/25  Plan of Care Expiration: 12/15/24                  Progress Update: 11/24/24                 Visit # / Visits authorized: 1 / 8                      FOTO: Visit #1 - 1/3     PTA Visit #: 0/5     FOTO Due Visit 5 -  Follow up  FOTO Due Visit 10 - Follow up    Time In: 500  Time Out: 530  Total Billable Timed: 30 minutes  Total Billable Un-timed: 0 minutes    Precautions: Standard    Subjective     The patient presents to therapy she felt better after initial, no headache for couple day s    Response to previous treatment: positive   Functional change: less headache     Pain: 4/10  Location: bilateral neck      Objective       Lizbeth received therapeutic exercises to develop strength, endurance, ROM, flexibility, and posture for  minutes including:      Lizbeth received the following manual therapy techniques:  were applied to the:  for 30 minutes, including:  Dry Needling Performed by Jorge Mckee, PT, Cert. DN: The patient was cleared of all contraindications and educated on the risks and effects of dry needling, and post needling expectations. The patient was agreeable to dry needling treatment. Pt signed consent form pre needling. Dry Needling was performed using 0.30 x 30mm needles to bilateral Upper Trap, Levators, paraspinals and suboccipitals, using piston and winding ,  without  electrical stimulation applied at 2 Hz for   minutes . No adverse affects were noted.     Lizbeth participated in neuromuscular re-education activities to improve: Balance, Coordination, Kinesthetic, Sense, and Proprioception for  minutes. The following activities were included:    Patient Education and HEP     She was compliant with home exercise program.    Education provided:   -     Written Home Exercises Provided: Patient instructed to cont prior HEP.  Exercises were reviewed and Lizbeth was able to demonstrate them prior to the end of the session.  Lizbeth demonstrated fair  understanding of the education provided.     See EMR under Patient Instructions for exercises provided prior visit.    Assessment     The patient has tension in her upper back ad neck, one of issues causing her headaches.  She was able to tolerate Dry Needle without any adverse effects.  Will continue as long as helping     Pt will continue to benefit from skilled outpatient physical therapy to address the deficits listed in the problem list box on initial evaluation, provide pt/family education and to maximize pt's level of independence in the home and community environment.     Lizbeth Is progressing well towards her goals.   Pt prognosis is Good.     Pt's spiritual, cultural and educational needs considered and pt agreeable to plan of care and goals.    Anticipated barriers to physical therapy: non    Goals:   SHORT TERM GOALS:  3 weeks  Progress Date met   Recent signs and systems trend is improving in order to progress towards LTG's. [] Met  [] Not Met  [x] Progressing     Patient will be independent with HEP in order to further progress and return to maximal function. [] Met  [] Not Met  [x] Progressing     Pain rating at Worst: 5/10 in order to progress towards increased independence with activity. [] Met  [] Not Met  [x] Progressing     Patient will be able to correct postural deviations in sitting and standing, to decrease  pain and promote postural awareness for injury prevention.  [] Met  [] Not Met  [x] Progressing       [] Met  [] Not Met  [] Progressing        LONG TERM GOALS: 6 weeks  Progress Date met   Patient will return to normal ADL, recreational, and work related activities with less pain and limitation.  [] Met  [] Not Met  [x] Progressing     Patient will improve AROM to stated goals in order to return to maximal functional potential.  [] Met  [] Not Met  [x] Progressing     Patient will improve Strength to stated goals of appropriate musculature in order to improve functional independence.  [] Met  [] Not Met  [x] Progressing     Pain Rating at Best: 1/10 to improve Quality of Life.  [] Met  [] Not Met  [x] Progressing     Patient will meet predicted functional outcome (FOTO) score: % to increase self-worth & perceived functional ability. [] Met  [] Not Met  [x] Progressing     Patient will have met/partially met personal goal of: less headaches  [] Met  [] Not Met  [x] Progressing         Plan     Continue with the plan of care established per initial evaluation    Jorge Mckee, PT

## 2024-12-02 ENCOUNTER — TELEPHONE (OUTPATIENT)
Dept: FAMILY MEDICINE | Facility: CLINIC | Age: 44
End: 2024-12-02
Payer: OTHER GOVERNMENT

## 2024-12-02 DIAGNOSIS — N92.6 IRREGULAR MENSTRUAL CYCLE: Primary | ICD-10-CM

## 2024-12-02 DIAGNOSIS — Z01.419 ENCOUNTER FOR CERVICAL PAP SMEAR WITH PELVIC EXAM: ICD-10-CM

## 2024-12-02 DIAGNOSIS — N87.9 CERVICAL DYSPLASIA: ICD-10-CM

## 2024-12-02 NOTE — TELEPHONE ENCOUNTER
----- Message from Med Assistant Lucio sent at 12/2/2024  2:58 PM CST -----  Patient wants to know if the referral was placed for labs?     [phone]

## 2024-12-02 NOTE — TELEPHONE ENCOUNTER
----- Message from Nurse Hunter sent at 12/2/2024  8:11 AM CST -----  Regarding: FW: gyn appt wants different provider    ----- Message -----  From: Loretta Ojeda  Sent: 12/2/2024   8:08 AM CST  To: Samara Bustamante Staff  Subject: gyn appt wants different provider                Pt wants to go somewhere else for gyn  they wouldn't allow her to make an appt upstairs because she didn't have her husbands ssn   please call her [phone]   she's very upset  thanks kbb

## 2024-12-02 NOTE — TELEPHONE ENCOUNTER
Spoke with pt. States her GI provider is wanting to send labs to [facility]. She is asking for our fax number. Pt advised that [facility] can not take order like that. She will need to have GI order electronically through [facility]. Pt voiced understanding.

## 2025-01-10 ENCOUNTER — OFFICE VISIT (OUTPATIENT)
Dept: FAMILY MEDICINE | Facility: CLINIC | Age: 45
End: 2025-01-10
Payer: OTHER GOVERNMENT

## 2025-01-10 ENCOUNTER — TELEPHONE (OUTPATIENT)
Dept: FAMILY MEDICINE | Facility: CLINIC | Age: 45
End: 2025-01-10

## 2025-01-10 VITALS
HEART RATE: 83 BPM | BODY MASS INDEX: 27.21 KG/M2 | OXYGEN SATURATION: 98 % | SYSTOLIC BLOOD PRESSURE: 102 MMHG | DIASTOLIC BLOOD PRESSURE: 78 MMHG | HEIGHT: 64 IN | WEIGHT: 159.38 LBS

## 2025-01-10 DIAGNOSIS — R40.0 DAYTIME SOMNOLENCE: ICD-10-CM

## 2025-01-10 DIAGNOSIS — R06.83 SNORING: ICD-10-CM

## 2025-01-10 DIAGNOSIS — F41.9 MILD ANXIETY: Primary | ICD-10-CM

## 2025-01-10 DIAGNOSIS — Q32.4 BRONCHIAL ATRESIA: ICD-10-CM

## 2025-01-10 DIAGNOSIS — N87.9 CERVICAL DYSPLASIA: ICD-10-CM

## 2025-01-10 PROCEDURE — 99214 OFFICE O/P EST MOD 30 MIN: CPT | Mod: S$GLB,,,

## 2025-01-10 NOTE — Clinical Note
Can we please reach out to [facility] and get records from visit on Jan 1st for Mrs. Berry please

## 2025-01-10 NOTE — TELEPHONE ENCOUNTER
----- Message from ALBERTO Marcano-CNP sent at 1/10/2025 10:35 AM CST -----  Can we please reach out to [facility] and get records from visit on Jan 1st for Mrs. Berry please

## 2025-01-10 NOTE — TELEPHONE ENCOUNTER
Spoke with representative at [facility] and requested last OV notes. She states she will get them faxed over.

## 2025-01-12 NOTE — PROGRESS NOTES
SUBJECTIVE:    Patient ID: Lizbeth Berry is a 44 y.o. female.    Chief Complaint: Follow-up (No bottles//Pt is here for a check up//Pt decline flu vaccine//Pt scheduled for gyn, on 01/29/2025.//[facility] )    Follow-up  Pertinent negatives include no arthralgias, chest pain, headaches, joint swelling, neck pain, vomiting or weakness.     History of Present Illness    CHIEF COMPLAINT:  Lizbeth presents today for follow-up after recent bronchitis diagnosis.    RESPIRATORY:  She was diagnosed with acute bronchitis at urgent care on January 1st. She was prescribed Z-Marcos and an inhalant, which provided improvement in symptoms within two days of initiation. She continues to experience a lingering cough. Her pulmonary referral appointment has been rescheduled from December to February 6th.    SLEEP:  She reports nighttime awakening, potentially related to  a new puppy. She has developed snoring and sleep apnea symptoms concurrent with recent weight gain. A sleep apnea test from 2018 was normal.    WEIGHT MANAGEMENT:  She reports a 20-pound weight gain over the past year, which she attributes to hormonal changes.    MENTAL HEALTH:  She reports feeling overwhelmed and sad due to weight gain and life changes. She experiences exhaustion affecting her ability to attend to her son.    PHYSICAL THERAPY:  She reports physical therapy with dry needling has been effective but finds weekly sessions challenging to maintain due to scheduling demands.    SPECIALIST FOLLOW-UP:  She reports a positive experience with neurologist consultation, noting excellent communication and appreciation for provider's respect of preference for natural treatment approaches. Follow-up appointment scheduled for next week. She also has a pending OBGYN appointment scheduled at [facility].    FAMILY HISTORY:  She reports family history of colon issues, including grandmother with colon rupture and great-aunt with severe constipation leading to fecal  vomiting.      ROS:  General: -fever, -chills, +fatigue, +weight gain, -weight loss  Eyes: -vision changes, -redness, -discharge  ENT: -ear pain, -nasal congestion, -sore throat  Cardiovascular: -chest pain, -palpitations, -lower extremity edema  Respiratory: +cough, -shortness of breath  Gastrointestinal: -abdominal pain, -nausea, -vomiting, -diarrhea, -constipation, -blood in stool  Genitourinary: -dysuria, -hematuria, -frequency  Musculoskeletal: -joint pain, -muscle pain  Skin: -rash, -lesion  Neurological: -headache, -dizziness, -numbness, -tingling  Psychiatric: -anxiety, -depression, +sleep difficulty, +emotional lability         Telephone on 09/20/2024   Component Date Value Ref Range Status    WBC 10/02/2024 6.6  3.8 - 10.8 Thousand/uL Final    RBC 10/02/2024 5.08  3.80 - 5.10 Million/uL Final    Hemoglobin 10/02/2024 15.1  11.7 - 15.5 g/dL Final    Hematocrit 10/02/2024 46.1 (H)  35.0 - 45.0 % Final    MCV 10/02/2024 90.7  80.0 - 100.0 fL Final    MCH 10/02/2024 29.7  27.0 - 33.0 pg Final    MCHC 10/02/2024 32.8  32.0 - 36.0 g/dL Final    RDW 10/02/2024 12.4  11.0 - 15.0 % Final    Platelets 10/02/2024 246  140 - 400 Thousand/uL Final    MPV 10/02/2024 11.4  7.5 - 12.5 fL Final    Neutrophils, Abs 10/02/2024 4,633  1,500 - 7,800 cells/uL Final    Lymph # 10/02/2024 1,511  850 - 3,900 cells/uL Final    Mono # 10/02/2024 363  200 - 950 cells/uL Final    Eos # 10/02/2024 53  15 - 500 cells/uL Final    Baso # 10/02/2024 40  0 - 200 cells/uL Final    Neutrophils Relative 10/02/2024 70.2  % Final    Lymph % 10/02/2024 22.9  % Final    Mono % 10/02/2024 5.5  % Final    Eosinophil % 10/02/2024 0.8  % Final    Basophil % 10/02/2024 0.6  % Final    Glucose 10/02/2024 55 (L)  65 - 99 mg/dL Final    BUN 10/02/2024 13  7 - 25 mg/dL Final    Creatinine 10/02/2024 0.73  0.50 - 0.99 mg/dL Final    eGFR 10/02/2024 105  > OR = 60 mL/min/1.73m2 Final    BUN/Creatinine Ratio 10/02/2024 SEE NOTE:  6 - 22 (calc) Final    Sodium  10/02/2024 136  135 - 146 mmol/L Final    Potassium 10/02/2024 4.0  3.5 - 5.3 mmol/L Final    Chloride 10/02/2024 103  98 - 110 mmol/L Final    CO2 10/02/2024 18 (L)  20 - 32 mmol/L Final    Calcium 10/02/2024 9.5  8.6 - 10.2 mg/dL Final    Total Protein 10/02/2024 7.9  6.1 - 8.1 g/dL Final    Albumin 10/02/2024 4.8  3.6 - 5.1 g/dL Final    Globulin, Total 10/02/2024 3.1  1.9 - 3.7 g/dL (calc) Final    Albumin/Globulin Ratio 10/02/2024 1.5  1.0 - 2.5 (calc) Final    Total Bilirubin 10/02/2024 1.0  0.2 - 1.2 mg/dL Final    Alkaline Phosphatase 10/02/2024 72  31 - 125 U/L Final    AST 10/02/2024 19  10 - 30 U/L Final    ALT 10/02/2024 16  6 - 29 U/L Final    Cholesterol 10/02/2024 222 (H)  <200 mg/dL Final    HDL 10/02/2024 67  > OR = 50 mg/dL Final    Triglycerides 10/02/2024 73  <150 mg/dL Final    LDL Cholesterol 10/02/2024 138 (H)  mg/dL (calc) Final    HDL/Cholesterol Ratio 10/02/2024 3.3  <5.0 (calc) Final    Non HDL Chol. (LDL+VLDL) 10/02/2024 155 (H)  <130 mg/dL (calc) Final    TSH w/reflex to FT4 10/02/2024 0.59  mIU/L Final    Color, UA 10/02/2024 YELLOW  YELLOW Final    Appearance, UA 10/02/2024 CLEAR  CLEAR Final    Specific [place], UA 10/02/2024 1.021  1.001 - 1.035 Final    pH, UA 10/02/2024 5.5  5.0 - 8.0 Final    Glucose, UA 10/02/2024 NEGATIVE  NEGATIVE Final    Bilirubin, UA 10/02/2024 NEGATIVE  NEGATIVE Final    Ketones, UA 10/02/2024 3+ (A)  NEGATIVE Final    Occult Blood UA 10/02/2024 NEGATIVE  NEGATIVE Final    Protein, UA 10/02/2024 TRACE (A)  NEGATIVE Final    Nitrite, UA 10/02/2024 NEGATIVE  NEGATIVE Final    Leukocytes, UA 10/02/2024 NEGATIVE  NEGATIVE Final    WBC Casts, UA 10/02/2024 NONE SEEN  < OR = 5 /HPF Final    RBC Casts, UA 10/02/2024 NONE SEEN  < OR = 2 /HPF Final    Squam Epithel, UA 10/02/2024 0-5  < OR = 5 /HPF Final    Bacteria, UA 10/02/2024 NONE SEEN  NONE SEEN /HPF Final    Hyaline Casts, UA 10/02/2024 0-5 (A)  NONE SEEN /LPF Final    Service Cmt: 10/02/2024 SEE COMMENT    Final    Reflexive Urine Culture 10/02/2024 SEE COMMENT   Final       Past Medical History:   Diagnosis Date    Bronchial atresia      Social History     Socioeconomic History    Marital status:    Tobacco Use    Smoking status: Never     Passive exposure: Never    Smokeless tobacco: Never   Substance and Sexual Activity    Alcohol use: Yes     Alcohol/week: 2.0 standard drinks of alcohol     Types: 2 Glasses of wine per week    Drug use: Never    Sexual activity: Not Currently     Partners: Male     Birth control/protection: None     Social Drivers of Health     Financial Resource Strain: Low Risk  (10/17/2024)    Overall Financial Resource Strain (CARDIA)     Difficulty of Paying Living Expenses: Not hard at all   Food Insecurity: No Food Insecurity (10/17/2024)    Hunger Vital Sign     Worried About Running Out of Food in the Last Year: Never true     Ran Out of Food in the Last Year: Never true   Physical Activity: Insufficiently Active (10/17/2024)    Exercise Vital Sign     Days of Exercise per Week: 3 days     Minutes of Exercise per Session: 30 min   Stress: Stress Concern Present (10/17/2024)    [employer] [employer] - Occupational Stress Questionnaire     Feeling of Stress : Very much   Housing Stability: Unknown (10/17/2024)    Housing Stability Vital Sign     Unable to Pay for Housing in the Last Year: No     Past Surgical History:   Procedure Laterality Date    COLONOSCOPY W/ BIOPSIES  12/2024     No family history on file.    The 10-year CVD risk score (D'Agostino, et al., 2008) is: 1.7%    Values used to calculate the score:      Age: 44 years      Sex: Female      Diabetic: No      Tobacco smoker: No      Systolic Blood Pressure: 102 mmHg      Is BP treated: No      HDL Cholesterol: 67 mg/dL      Total Cholesterol: 222 mg/dL    Tests to Keep You Healthy    Mammogram: Met on 10/11/2024  Cervical Cancer Screening: DUE      Review of patient's allergies indicates:  No Known  "Allergies    Current Outpatient Medications:     ADVAIR HFA 45-21 mcg/actuation HFAA inhaler, Inhale into the lungs., Disp: , Rfl:     albuterol (PROVENTIL/VENTOLIN HFA) 90 mcg/actuation inhaler, Inhale into the lungs., Disp: , Rfl:     magnesium oxide (MAG-OX) 400 mg (241.3 mg magnesium) tablet, Take 1 tablet (400 mg total) by mouth 2 (two) times daily., Disp: 60 tablet, Rfl: 12    digital therapeutic,NOE device Misc, 1 each by Misc.(Non-Drug; Combo Route) route daily as needed (migraine). (Patient not taking: Reported on 1/10/2025), Disp: 1 each, Rfl: 11    loratadine (CLARITIN) 10 mg tablet, Take 10 mg by mouth. (Patient not taking: Reported on 1/10/2025), Disp: , Rfl:     Review of Systems   Constitutional:  Positive for unexpected weight change. Negative for activity change.   HENT:  Negative for hearing loss, rhinorrhea and trouble swallowing.    Eyes:  Negative for discharge and visual disturbance.   Respiratory:  Negative for chest tightness and wheezing.    Cardiovascular:  Negative for chest pain and palpitations.   Gastrointestinal:  Negative for blood in stool, constipation, diarrhea and vomiting.   Endocrine: Negative for polydipsia and polyuria.   Genitourinary:  Positive for menstrual problem. Negative for difficulty urinating, dysuria and hematuria.   Musculoskeletal:  Negative for arthralgias, joint swelling and neck pain.   Neurological:  Negative for weakness and headaches.   Psychiatric/Behavioral:  Negative for confusion and dysphoric mood.            Objective:      Vitals:    01/10/25 0957   BP: 102/78   Pulse: 83   SpO2: 98%   Weight: 72.3 kg (159 lb 6.4 oz)   Height: 5' 4" (1.626 m)     Physical Exam  Vitals and nursing note reviewed.   Constitutional:       General: She is not in acute distress.     Appearance: Normal appearance. She is well-developed. She is not ill-appearing.   HENT:      Head: Normocephalic and atraumatic.      Right Ear: External ear normal.      Left Ear: External ear " normal.      Nose: Nose normal.      Mouth/Throat:      Lips: Pink.      Pharynx: Oropharynx is clear.   Eyes:      General: No scleral icterus.     Pupils: Pupils are equal, round, and reactive to light.   Neck:      Thyroid: No thyromegaly.      Vascular: No carotid bruit.   Cardiovascular:      Rate and Rhythm: Normal rate and regular rhythm.      Pulses:           Radial pulses are 2+ on the right side and 2+ on the left side.      Heart sounds: Normal heart sounds. No murmur heard.  Pulmonary:      Effort: Pulmonary effort is normal.      Breath sounds: Normal breath sounds.   Abdominal:      General: Bowel sounds are normal.      Palpations: Abdomen is soft.      Tenderness: There is no abdominal tenderness.   Musculoskeletal:         General: Normal range of motion.      Cervical back: Normal range of motion.      Lumbar back: Normal. No spasms.      Right lower leg: No edema.      Left lower leg: No edema.   Skin:     General: Skin is warm and dry.      Capillary Refill: Capillary refill takes less than 2 seconds.   Neurological:      General: No focal deficit present.      Mental Status: She is alert and oriented to person, place, and time.      Cranial Nerves: No cranial nerve deficit.      Sensory: Sensation is intact.      Motor: No weakness.      Gait: Gait is intact.   Psychiatric:         Attention and Perception: Attention normal.         Mood and Affect: Mood normal.         Speech: Speech normal.         Behavior: Behavior is cooperative.         Thought Content: Thought content does not include homicidal or suicidal ideation.       Physical Exam              Assessment:       1. Mild anxiety    2. Snoring    3. Daytime somnolence    4. Bronchial atresia    5. Cervical dysplasia         Plan:               Assessment & Plan    IMPRESSION:  - Assessed recent bronchitis episode and current symptoms  - Reviewed Z-Marcos and inhaler treatment from urgent care, noting improvement  - Evaluated lung sounds,  finding no current wheezing  - Considered possibility of sleep apnea based on colonoscopy procedure observations and patient's weight gain  - Acknowledged potential link between weight gain and sleep-related breathing issues    BRONCHITIS:  - Evaluated the patient's lungs and found no wheezing.  - Discussed the expected duration of post-bronchitis cough (4-6 weeks).  - Explained the rationale for steroid use in bronchitis treatment, highlighting its role in reducing lung inflammation.  - Continued Z-Marcos (azithromycin) and inhaler as prescribed by urgent care for bronchitis treatment.  - Noted that the treatment appears to be effective as the patient reports feeling better within 2 days of starting the Z-Marcos.  - Requested urgent care records, including chest XR results, from [facility].      Snoring  -     Ambulatory referral/consult to Sleep Disorders; Future; Expected date: 01/17/2025    Daytime somnolence  -     Ambulatory referral/consult to Sleep Disorders; Future; Expected date: 01/17/2025  SLEEP APNEA:  - Noted that the patient reports waking up with jaw discomfort, which may be related to sleep apnea.  - Observed that the patient mentions weight gain, which can affect snoring and sleep apnea.  - Considered the patient's report that during a colonoscopy, medical staff had to hold her head up due to potential sleep apnea concerns.  - Planned to order a sleep study to be read by Dr. Fonseca.  - Advised considering scheduling a sleep study with Dr. Fonseca once established with pulmonology.    Mild anxiety  DEPRESSION:  - Noted that the patient reports feeling sad all the time, experiencing stress, and having difficulty giving attention to her son due to exhaustion.  - Acknowledged patient's reports of depression symptoms in the context of discussing hormonal changes and weight gain.    Cervical dysplasia  PERIMENOPAUSE:  - Noted that the patient reports experiencing symptoms consistent with perimenopause,  including weight gain, acne, and mood changes.  - Acknowledged patient's concerns about hormonal changes and the need for further evaluation.  - Scheduled follow-up on January 29th with Dr. Grijalva (GYN).  - Noted that the patient reports symptoms potentially related to premature menopause, including weight gain, mood changes, and fatigue.  - Acknowledged the need for hormonal evaluation, including cortisol levels.  - Confirmed that the patient has an upcoming appointment with OBGYN for evaluation of hormonal changes.      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  - Confirmed that the patient has a pending pulmonary referral appointment scheduled for February or March.  - Scheduled follow-up on March 25th with Dr. Fonseca (Pulmono  logy).            Follow up in about 1 year (around 1/10/2026), or if symptoms worsen or fail to improve, for ANNUAL.        This note was generated with the assistance of ambient listening technology. Verbal consent was obtained by the patient and accompanying visitor(s) for the recording of patient appointment to facilitate this note. I attest to having reviewed and edited the generated note for accuracy, though some syntax or spelling errors may persist. Please contact the author of this note for any clarification.      1/12/2025 Kaelyn Pastrana

## 2025-01-14 ENCOUNTER — TELEPHONE (OUTPATIENT)
Dept: NEUROLOGY | Facility: CLINIC | Age: 45
End: 2025-01-14
Payer: OTHER GOVERNMENT

## 2025-01-16 ENCOUNTER — OFFICE VISIT (OUTPATIENT)
Dept: NEUROLOGY | Facility: CLINIC | Age: 45
End: 2025-01-16
Payer: OTHER GOVERNMENT

## 2025-01-16 DIAGNOSIS — M79.18 CERVICAL MYOFASCIAL PAIN SYNDROME: ICD-10-CM

## 2025-01-16 DIAGNOSIS — G43.919 INTRACTABLE MIGRAINE WITHOUT STATUS MIGRAINOSUS, UNSPECIFIED MIGRAINE TYPE: Primary | ICD-10-CM

## 2025-01-16 DIAGNOSIS — F45.8 BRUXISM: ICD-10-CM

## 2025-01-16 DIAGNOSIS — G44.40 MEDICATION OVERUSE HEADACHE: ICD-10-CM

## 2025-01-16 NOTE — PATIENT INSTRUCTIONS
Please call our clinic at [phone] or send a message on the [employer] portal if there are any changes to the plan described below, for example,if you are not contacted for the requested tests, referral(s) within one week, if you are unable to receive the medications prescribed, or if you feel you need to change the treatment course for any reason.     TESTING:  -- none    REFERRALS:  -- physical therapy    PREVENTION (use daily regardless of headache):  -- continue magnesium in ONE of the following preparations -               1. Magnesium oxide 800mg daily (the most common over the counter kind, may causes loose stools)              2. Magnesium citrate 400-500mg daily (harder to find, but more neutral on the bowels)              3. Magnesium glycinate 400mg daily (hardest to find, look online, but most bowel-neutral, best absorbed)   -- can also add B2 and coQ10 enzyme    AS-NEEDED TREATMENT (use total no more than 10 days per month unless otherwise stated):  -- continue to limit Advil to 8 days per month or less

## 2025-01-16 NOTE — PROGRESS NOTES
Date of service: 1/16/2025  Referring provider: No ref. provider found    Subjective:      Chief complaint: Headache       Patient ID: Lizbeth Berry is a 44 y.o. female with asthma, constipation, fatigue, GERD, anxiety who presents for follow up of headache     History of Present Illness    INTERVAL HISTORY 1/16/25  The patient location is: home   The chief complaint leading to consultation is: follow up  Visit type: audiovisual  20 minutes of total time spent on the encounter, which includes face to face time and non-face to face time preparing to see the patient (eg, review of tests), Obtaining and/or reviewing separately obtained history, Documenting clinical information in the electronic or other health record, Independently interpreting results (not separately reported) and communicating results to the patient/family/caregiver, or Care coordination (not separately reported).   Each patient to whom he or she provides medical services by telemedicine is:  (1) informed of the relationship between the physician and patient and the respective role of any other health care provider with respect to management of the patient; and (2) notified that he or she may decline to receive medical services by telemedicine and may withdraw from such care at any time.    Notes:     Last visit was about three months ago and at that time we started magnesium and Nerivio device.    Today she reports she is better. She did not get Nerivio device. She did get dry needling and start magnesium. She feels magnesium and PT has helped. She does better with more sleep. Work and stress with lack of sleep are her main triggers. Current pain 0 with range 0-6. She takes advil but has reduced overall use. Otherwise information below is reviewed and verified with no changes made    ORIGINAL HEADACHE HISTORY - 10/17/24  Age at onset and course over time: early 2010's began with headaches. She recalls a severe migraine in 2016 that lasted three  years. Acupuncture broke that cycle. Headaches returned around 2019. She moved from [place] to [place] last year and headaches have worsened. She typically takes ibuprofen and headaches go away.   Her  deployed one month ago and she had poor sleep hygiene. Headaches have worsened since that time.  Family history of headaches - sister had menstrual migraines  Last eye exam - 2023. Wears glasses for computer screen use and nighttime driving.   She works for [employer] remotely. She states this is a stressful job and she is on the computer all day.   She does not like to take medication.    Location: front of head, back of neck  Quality:  [x] pressure [] tight [x] throbbing [] sharp [x] stabbing   Severity: current 3 with range 3-10  Duration: hours  Frequency: daily  Headaches awaken at night?: yes   Worst time of day: mid-day  Associated with: [x] photophobia [x]  phonophobia [] osmophobia [] blurred vision  [] double vision [] loss of appetite [x] nausea [] vomiting [] dizziness [] vertigo  [] tinnitus [x] irritability [] sinus pressure [x] problems with concentration   [x] neck tightness   Alleviated by:  [] sleep [] darkness [] massage [] heat [] ice [] medication  Exacerbated by:  [x] fatigue [] light [] noise [] smells [] coughing [] sneezing  [] bending over [x] ovulation [x] menses [] alcohol [x] change in weather [x]  stress  Ipsilateral autonomic: [] nasal congestion [] lacrimation [] ptosis [] injection [] edema [] foreign body sensation [] ear fullness   ICP:  [] transient visual obscurations  [] tinnitus   [] positional headache  [x] non-positional   Sleep habits: trouble falling asleep, trouble staying asleep, unrefreshing sleep, snoring?  Caffeine intake: 3 cups  Gyn status (if female): having periods  HIT 6: 66    Current acute treatment:  Advil - reduced to no more than 8 days per month     Current prevention:  Magnesium   ([place])  (Multivitamin)    Previously tried/failed acute  treatment:  Excedrin  Ibuprofen    Previously tried/failed preventative treatment:  None      Review of patient's allergies indicates:  No Known Allergies  Current Outpatient Medications   Medication Sig Dispense Refill    ADVAIR HFA 45-21 mcg/actuation HFAA inhaler Inhale into the lungs.      albuterol (PROVENTIL/VENTOLIN HFA) 90 mcg/actuation inhaler Inhale into the lungs.      digital therapeutic,NOE device Misc 1 each by Misc.(Non-Drug; Combo Route) route daily as needed (migraine). (Patient not taking: Reported on 1/10/2025) 1 each 11    loratadine (CLARITIN) 10 mg tablet Take 10 mg by mouth. (Patient not taking: Reported on 1/10/2025)      magnesium oxide (MAG-OX) 400 mg (241.3 mg magnesium) tablet Take 1 tablet (400 mg total) by mouth 2 (two) times daily. 60 tablet 12     No current facility-administered medications for this visit.       Past Medical History  Past Medical History:   Diagnosis Date    Bronchial atresia        Past Surgical History  Past Surgical History:   Procedure Laterality Date    COLONOSCOPY W/ BIOPSIES  12/2024       Family History  No family history on file.    Social History  Social History     Socioeconomic History    Marital status:    Tobacco Use    Smoking status: Never     Passive exposure: Never    Smokeless tobacco: Never   Substance and Sexual Activity    Alcohol use: Yes     Alcohol/week: 2.0 standard drinks of alcohol     Types: 2 Glasses of wine per week    Drug use: Never    Sexual activity: Not Currently     Partners: Male     Birth control/protection: None     Social Drivers of Health     Financial Resource Strain: Low Risk  (10/17/2024)    Overall Financial Resource Strain (CARDIA)     Difficulty of Paying Living Expenses: Not hard at all   Food Insecurity: No Food Insecurity (10/17/2024)    Hunger Vital Sign     Worried About Running Out of Food in the Last Year: Never true     Ran Out of Food in the Last Year: Never true   Physical Activity: Insufficiently  Active (10/17/2024)    Exercise Vital Sign     Days of Exercise per Week: 3 days     Minutes of Exercise per Session: 30 min   Stress: Stress Concern Present (10/17/2024)    [employer] [place] [employer] - Occupational Stress Questionnaire     Feeling of Stress : Very much   Housing Stability: Unknown (10/17/2024)    Housing Stability Vital Sign     Unable to Pay for Housing in the Last Year: No        Objective:        There were no vitals filed for this visit.    There is no height or weight on file to calculate BMI.    1/16/25  Constitutional:   She appears well-developed and well-nourished. She is well groomed     Neurological Exam:  General: well-developed, well-nourished, no distress  Mental status: Awake and alert  Speech language: No dysarthria or aphasia on conversation  Cranial nerves: Face symmetric      10/17/24  Constitutional: appears in no acute distress, well-developed, well-nourished     Eyes: normal conjunctiva, PERRLA    Ears, nose, mouth, throat: external appearance of ears and nose normal, hearing intact. Tongue scalloping      Cardiovascular: regular rate and rhythm, no murmurs appreciated    Respiratory: unlabored respirations, breath sounds normal bilaterally    Gastrointestinal: no visible abdominal masses, no guarding, no visible hernia    Musculoskeletal: normal tone in all four extremities. No abnormal movements. No pronator drift. No orbit. Symmetric finger tapping. Normal station. Normal regular gait. Normal tandem gait.      Spine:   CERVICAL SPINE:  ROM: normal   MUSCLE SPASM: in all planes    FACET LOADING: right    SPURLING: no  JEREMIAH / MARCIANO tender: no     Psychiatric: normal judgment and insight. Oriented to person, place, and time.     Neurologic:   Cortical functions: recent and remote memory intact, normal attention span and concentration, speech fluent, adequate fund of knowledge   Cranial nerves: visual fields full, PERRLA, EOMI, symmetric facial strength, hearing  "intact, palate elevates symmetrically, shoulder shrug 5/5, tongue protrudes midline   Reflexes: 2+ in the upper and lower extremities, no Martinez  Sensation: intact to temperature throughout   Coordination: normal finger to nose, heel to shin    Data Review:     I have personally reviewed the referring provider's notes, labs, & imaging made available to me today.      RADIOLOGY STUDIES:  I have personally reviewed the pertinent images performed.       No results found for this or any previous visit.    Lab Results   Component Value Date     10/02/2024    K 4.0 10/02/2024     10/02/2024    CO2 18 (L) 10/02/2024    BUN 13 10/02/2024    CREATININE 0.73 10/02/2024    GLU 55 (L) 10/02/2024    AST 19 10/02/2024    ALT 16 10/02/2024    ALBUMIN 4.8 10/02/2024    PROT 7.9 10/02/2024    BILITOT 1.0 10/02/2024    CHOL 222 (H) 10/02/2024    HDL 67 10/02/2024    LDLCALC 138 (H) 10/02/2024    TRIG 73 10/02/2024       Lab Results   Component Value Date    WBC 6.6 10/02/2024    HGB 15.1 10/02/2024    HCT 46.1 (H) 10/02/2024    MCV 90.7 10/02/2024     10/02/2024       No results found for: "TSH"        Assessment & Plan:       Problem List Items Addressed This Visit    None  Visit Diagnoses       Intractable migraine without status migrainosus, unspecified migraine type    -  Primary    Improving with magnesium, PT and reducing OTC use. Continue current plan.    Relevant Orders    Ambulatory referral/consult to Physical/Occupational Therapy    Cervical myofascial pain syndrome        Continue PT    Relevant Orders    Ambulatory referral/consult to Physical/Occupational Therapy    Bruxism        Continue PT    Relevant Orders    Ambulatory referral/consult to Physical/Occupational Therapy    Medication overuse headache        Resolved. She has reduced Advil from near daily to no more than 8 days per month                  Please call our clinic at [phone] or send a message on the via680 portal if there are " any changes to the plan described below, for example,if you are not contacted for the requested tests, referral(s) within one week, if you are unable to receive the medications prescribed, or if you feel you need to change the treatment course for any reason.     TESTING:  -- none    REFERRALS:  -- physical therapy    PREVENTION (use daily regardless of headache):  -- continue magnesium in ONE of the following preparations -               1. Magnesium oxide 800mg daily (the most common over the counter kind, may causes loose stools)              2. Magnesium citrate 400-500mg daily (harder to find, but more neutral on the bowels)              3. Magnesium glycinate 400mg daily (hardest to find, look online, but most bowel-neutral, best absorbed)   -- can also add B2 and coQ10 enzyme    AS-NEEDED TREATMENT (use total no more than 10 days per month unless otherwise stated):  -- continue to limit Advil to 8 days per month or less    Follow up in about 4 months (around 5/16/2025).    Radha Zaragoza NP

## 2025-01-27 ENCOUNTER — TELEPHONE (OUTPATIENT)
Dept: NEUROLOGY | Facility: CLINIC | Age: 45
End: 2025-01-27
Payer: OTHER GOVERNMENT

## 2025-01-27 NOTE — TELEPHONE ENCOUNTER
----- Message from Alessia sent at 1/27/2025  2:27 PM CST -----  Regarding: NP PHYSICAL THERAPY ROXANNA  Good afternoon,    The Physical/Occupational Therapy department received your order to get the attached patient scheduled for an evaluation. We have reached out to the patient to schedule their evaluation; however, we have been unsuccessful in reaching them.      We wanted you to be aware that your patient has not started therapy. If you speak with them again about starting therapy please have them reach out to us at [phone] to get scheduled.     Sincerely,    Chika Montesinos

## 2025-01-29 ENCOUNTER — OFFICE VISIT (OUTPATIENT)
Dept: OBSTETRICS AND GYNECOLOGY | Facility: CLINIC | Age: 45
End: 2025-01-29
Payer: OTHER GOVERNMENT

## 2025-01-29 ENCOUNTER — LAB VISIT (OUTPATIENT)
Dept: LAB | Facility: HOSPITAL | Age: 45
End: 2025-01-29
Attending: STUDENT IN AN ORGANIZED HEALTH CARE EDUCATION/TRAINING PROGRAM
Payer: OTHER GOVERNMENT

## 2025-01-29 VITALS
HEIGHT: 64 IN | HEART RATE: 67 BPM | WEIGHT: 162.13 LBS | BODY MASS INDEX: 27.68 KG/M2 | SYSTOLIC BLOOD PRESSURE: 126 MMHG | DIASTOLIC BLOOD PRESSURE: 79 MMHG

## 2025-01-29 DIAGNOSIS — N92.6 IRREGULAR MENSTRUAL CYCLE: Primary | ICD-10-CM

## 2025-01-29 DIAGNOSIS — N87.9 CERVICAL DYSPLASIA: ICD-10-CM

## 2025-01-29 DIAGNOSIS — N92.6 IRREGULAR MENSTRUAL CYCLE: ICD-10-CM

## 2025-01-29 DIAGNOSIS — Z01.419 ENCOUNTER FOR CERVICAL PAP SMEAR WITH PELVIC EXAM: ICD-10-CM

## 2025-01-29 PROBLEM — R10.13 DYSPEPSIA: Status: RESOLVED | Noted: 2024-10-04 | Resolved: 2025-01-29

## 2025-01-29 PROBLEM — R87.610 ATYPICAL SQUAMOUS CELLS OF UNDETERMINED SIGNIFICANCE (ASCUS) ON PAPANICOLAOU SMEAR OF CERVIX: Status: RESOLVED | Noted: 2024-10-04 | Resolved: 2025-01-29

## 2025-01-29 LAB
DHEA-S SERPL-MCNC: 152 UG/DL (ref 74.8–410.2)
ESTRADIOL SERPL-MCNC: 118 PG/ML
FSH SERPL-ACNC: 1.91 MIU/ML
LH SERPL-ACNC: 2 MIU/ML
PROLACTIN SERPL IA-MCNC: 50.3 NG/ML (ref 5.2–26.5)
TESTOST SERPL-MCNC: 28 NG/DL (ref 5–73)

## 2025-01-29 PROCEDURE — 84403 ASSAY OF TOTAL TESTOSTERONE: CPT | Performed by: STUDENT IN AN ORGANIZED HEALTH CARE EDUCATION/TRAINING PROGRAM

## 2025-01-29 PROCEDURE — 87624 HPV HI-RISK TYP POOLED RSLT: CPT | Performed by: STUDENT IN AN ORGANIZED HEALTH CARE EDUCATION/TRAINING PROGRAM

## 2025-01-29 PROCEDURE — 99999 PR PBB SHADOW E&M-EST. PATIENT-LVL IV: CPT | Mod: PBBFAC,,, | Performed by: STUDENT IN AN ORGANIZED HEALTH CARE EDUCATION/TRAINING PROGRAM

## 2025-01-29 PROCEDURE — 84270 ASSAY OF SEX HORMONE GLOBUL: CPT | Performed by: STUDENT IN AN ORGANIZED HEALTH CARE EDUCATION/TRAINING PROGRAM

## 2025-01-29 PROCEDURE — 84146 ASSAY OF PROLACTIN: CPT | Performed by: STUDENT IN AN ORGANIZED HEALTH CARE EDUCATION/TRAINING PROGRAM

## 2025-01-29 PROCEDURE — 36415 COLL VENOUS BLD VENIPUNCTURE: CPT | Mod: PO | Performed by: STUDENT IN AN ORGANIZED HEALTH CARE EDUCATION/TRAINING PROGRAM

## 2025-01-29 PROCEDURE — 83002 ASSAY OF GONADOTROPIN (LH): CPT | Performed by: STUDENT IN AN ORGANIZED HEALTH CARE EDUCATION/TRAINING PROGRAM

## 2025-01-29 PROCEDURE — 83001 ASSAY OF GONADOTROPIN (FSH): CPT | Performed by: STUDENT IN AN ORGANIZED HEALTH CARE EDUCATION/TRAINING PROGRAM

## 2025-01-29 PROCEDURE — 82670 ASSAY OF TOTAL ESTRADIOL: CPT | Performed by: STUDENT IN AN ORGANIZED HEALTH CARE EDUCATION/TRAINING PROGRAM

## 2025-01-29 PROCEDURE — 83498 ASY HYDROXYPROGESTERONE 17-D: CPT | Performed by: STUDENT IN AN ORGANIZED HEALTH CARE EDUCATION/TRAINING PROGRAM

## 2025-01-29 PROCEDURE — 99214 OFFICE O/P EST MOD 30 MIN: CPT | Mod: PBBFAC,PO | Performed by: STUDENT IN AN ORGANIZED HEALTH CARE EDUCATION/TRAINING PROGRAM

## 2025-01-29 PROCEDURE — 82627 DEHYDROEPIANDROSTERONE: CPT | Performed by: STUDENT IN AN ORGANIZED HEALTH CARE EDUCATION/TRAINING PROGRAM

## 2025-01-29 PROCEDURE — 99386 PREV VISIT NEW AGE 40-64: CPT | Mod: S$PBB,,, | Performed by: STUDENT IN AN ORGANIZED HEALTH CARE EDUCATION/TRAINING PROGRAM

## 2025-01-29 PROCEDURE — 88175 CYTOPATH C/V AUTO FLUID REDO: CPT | Performed by: STUDENT IN AN ORGANIZED HEALTH CARE EDUCATION/TRAINING PROGRAM

## 2025-01-29 NOTE — PROGRESS NOTES
Chief Complaint   Patient presents with    Annual Exam     Irregular menstrual cycle       History of Present Illness   Lizbeth Berry is 45 y.o.   patient who presents today to discuss possible perimenopausal symptoms.  Patient reported that it started 2-2.5 yrs and has become progressively worse.  She reports symptoms of weight gain, fatigue, labile moods, palpitations, constipation, inc frequency of migraines, insomnia, snoring, LBP, acne, etc.  Patient reports monthly cycles that are heavier and longer than before.  Her  is deployed and she is taking care for their teenage son.  She feels like she may be depressed.  She walks her dog and drops off/ her son at school.  Otherwise, she does not do anything else.      History  PMH: HTN, Asthma, Anxiety, Lung lesion, Migraines, Acne, GERD, constipation, Insomnia  Psx: Denies  All: Latex  OB:   GYN: Denies any STIs; H/O cervical dysplasia  FH: colon cancer (MGM); Denies any female cancer or MI prior to 49yo  SH: Denies EAGLE  Meds:  Current Outpatient Medications   Medication Instructions    ADVAIR HFA 45-21 mcg/actuation HFAA inhaler Inhale into the lungs.    albuterol (PROVENTIL/VENTOLIN HFA) 90 mcg/actuation inhaler Inhale into the lungs.    digital therapeutic,NOE device Misc 1 each, Misc.(Non-Drug; Combo Route), Daily PRN    loratadine (CLARITIN) 10 mg    magnesium oxide (MAG-OX) 400 mg, Oral, 2 times daily       Review of Systems   Constitutional:  Positive for unexpected weight change. Negative for chills and fever.   Eyes:  Negative for visual disturbance.   Respiratory:  Negative for cough and shortness of breath.    Cardiovascular:  Negative for chest pain and palpitations.   Gastrointestinal:  Positive for constipation. Negative for abdominal pain, nausea and vomiting.   Endocrine: Positive for hair loss.   Genitourinary:  Negative for dysmenorrhea, menstrual problem, vaginal discharge, vaginal pain and vaginal odor.  "  Integumentary:  Positive for acne.   Neurological:  Negative for headaches.   Psychiatric/Behavioral:  Positive for depression and sleep disturbance. The patient is nervous/anxious.    All other systems reviewed and are negative.  Breast: Negative for lump and mastodynia        Physical Examination:  Vitals:    01/29/25 1025   BP: 126/79   BP Location: Left arm   Patient Position: Sitting   Pulse: 67   Weight: 73.5 kg (162 lb 2.4 oz)   Height: 5' 4" (1.626 m)        Physical Exam  Vitals reviewed. Exam conducted with a chaperone present.   Constitutional:       Appearance: Normal appearance.   HENT:      Head: Normocephalic and atraumatic.   Eyes:      Conjunctiva/sclera: Conjunctivae normal.   Cardiovascular:      Rate and Rhythm: Normal rate and regular rhythm.   Pulmonary:      Effort: Pulmonary effort is normal.      Breath sounds: Normal breath sounds. No wheezing.   Abdominal:      General: Abdomen is flat.      Palpations: Abdomen is soft.      Tenderness: There is no abdominal tenderness.   Genitourinary:     Exam position: Lithotomy position.      Labia:         Right: No tenderness or lesion.         Left: No tenderness or lesion.       Urethra: No prolapse.   Musculoskeletal:         General: Normal range of motion.      Cervical back: Normal range of motion.   Lymphadenopathy:      Lower Body: No right inguinal adenopathy. No left inguinal adenopathy.   Skin:     General: Skin is warm and dry.   Neurological:      General: No focal deficit present.      Mental Status: She is alert and oriented to person, place, and time.   Psychiatric:         Mood and Affect: Mood normal.         Behavior: Behavior normal.         Thought Content: Thought content normal.         Judgment: Judgment normal.        Assessment:    1. Irregular menstrual cycle  Ambulatory referral/consult to Obstetrics / Gynecology    Estradiol    Luteinizing Hormone    Follicle Stimulating Hormone    DHEA-Sulfate    Prolactin    " 17-Hydroxyprogesterone    Testosterone    Sex Hormone Binding Globulin      2. Encounter for cervical Pap smear with pelvic exam  Ambulatory referral/consult to Obstetrics / Gynecology    Liquid-Based Pap Smear, Screening    HPV High Risk Genotypes, PCR      3. Cervical dysplasia  Ambulatory referral/consult to Obstetrics / Gynecology    Liquid-Based Pap Smear, Screening    HPV High Risk Genotypes, PCR          Plan:  Pap obtained  Hormone panel ordered  Counseled that the variety of symptoms can be multifactorial.  Discussed sleep study to assess for CARLOS, which can be responsible for symptoms patient mentioned.  Counseled on use hormonal contraception to counter natural flux that can cause labile moods.  Discussed options to treat anxiety and depression.  Discussed exercise and meditation can improve symptoms.  Will await results of hormone panel prior to starting any medications    I spent a total of 68 minutes on the day of the visit.This includes face to face time and non-face to face time preparing to see the patient (eg, review of tests), obtaining and/or reviewing separately obtained history, documenting clinical information in the electronic or other health record, independently interpreting results and communicating results to the patient/family/caregiver, or care coordinator.

## 2025-02-02 LAB
FINAL PATHOLOGIC DIAGNOSIS: NORMAL
Lab: NORMAL

## 2025-02-04 ENCOUNTER — TELEPHONE (OUTPATIENT)
Dept: OBSTETRICS AND GYNECOLOGY | Facility: CLINIC | Age: 45
End: 2025-02-04
Payer: OTHER GOVERNMENT

## 2025-02-04 DIAGNOSIS — R79.89 ELEVATED PROLACTIN LEVEL: Primary | ICD-10-CM

## 2025-02-04 LAB
17OHP SERPL-MCNC: 66 NG/DL (ref 35–413)
SHBG SERPL-SCNC: 70 NMOL/L

## 2025-02-04 NOTE — TELEPHONE ENCOUNTER
Fasting lab scheduled in 6 weeks as order per Dr. Grijalva at lab. Pt verbalized understanding and will call with any other questions or concerns.

## 2025-02-04 NOTE — TELEPHONE ENCOUNTER
----- Message from Marleni Grijalva MD sent at 2/4/2025 11:44 AM CST -----  Regarding: needs lab schedule  Please call and schedule her fasting lab in 6 weeks as early in the morning as possible.  Thank you!

## 2025-03-18 ENCOUNTER — PATIENT MESSAGE (OUTPATIENT)
Dept: PULMONOLOGY | Facility: CLINIC | Age: 45
End: 2025-03-18
Payer: OTHER GOVERNMENT

## 2025-03-19 ENCOUNTER — LAB VISIT (OUTPATIENT)
Dept: LAB | Facility: HOSPITAL | Age: 45
End: 2025-03-19
Attending: STUDENT IN AN ORGANIZED HEALTH CARE EDUCATION/TRAINING PROGRAM
Payer: OTHER GOVERNMENT

## 2025-03-19 ENCOUNTER — RESULTS FOLLOW-UP (OUTPATIENT)
Dept: OBSTETRICS AND GYNECOLOGY | Facility: CLINIC | Age: 45
End: 2025-03-19
Payer: OTHER GOVERNMENT

## 2025-03-19 DIAGNOSIS — R79.89 ELEVATED PROLACTIN LEVEL: Primary | ICD-10-CM

## 2025-03-19 DIAGNOSIS — R79.89 ELEVATED PROLACTIN LEVEL: ICD-10-CM

## 2025-03-19 LAB — PROLACTIN SERPL IA-MCNC: 74.7 NG/ML (ref 5.2–26.5)

## 2025-03-19 PROCEDURE — 99999 PR PBB SHADOW E&M-EST. PATIENT-LVL I: CPT | Mod: PBBFAC,,, | Performed by: STUDENT IN AN ORGANIZED HEALTH CARE EDUCATION/TRAINING PROGRAM

## 2025-03-19 PROCEDURE — 36415 COLL VENOUS BLD VENIPUNCTURE: CPT | Mod: PO | Performed by: STUDENT IN AN ORGANIZED HEALTH CARE EDUCATION/TRAINING PROGRAM

## 2025-03-19 PROCEDURE — 84146 ASSAY OF PROLACTIN: CPT | Performed by: STUDENT IN AN ORGANIZED HEALTH CARE EDUCATION/TRAINING PROGRAM

## 2025-03-20 ENCOUNTER — TELEPHONE (OUTPATIENT)
Dept: FAMILY MEDICINE | Facility: CLINIC | Age: 45
End: 2025-03-20
Payer: OTHER GOVERNMENT

## 2025-03-20 NOTE — TELEPHONE ENCOUNTER
Spoke with patient would like to hear from Nai MILLER Regarding lab results for Prolactin. Patient aware the results where collected for Dr. Grijalva. Needs Nai VIVAS advise.

## 2025-03-20 NOTE — TELEPHONE ENCOUNTER
----- Message from Kaelyn sent at 3/20/2025  8:30 AM CDT -----  Pt is calling about her lab results that she saw on the portal [phone]

## 2025-03-21 ENCOUNTER — TELEPHONE (OUTPATIENT)
Dept: FAMILY MEDICINE | Facility: CLINIC | Age: 45
End: 2025-03-21
Payer: OTHER GOVERNMENT

## 2025-03-21 DIAGNOSIS — R79.89 ELEVATED PROLACTIN LEVEL: Primary | ICD-10-CM

## 2025-03-21 NOTE — TELEPHONE ENCOUNTER
----- Message from Nurse Cárdenas sent at 3/21/2025 10:14 AM CDT -----  Regarding: Endocrine Referral  Good morning,Dr. Grijalva is placing a referral for pt to see Endocrine for elevated prolactin levels. Pt wanted you notified in case you need to place the referral instead.Thanks!

## 2025-03-21 NOTE — TELEPHONE ENCOUNTER
----- Message from Génesis sent at 3/21/2025 10:49 AM CDT -----  YARELI just sent a message to our office so that she is able to have a referral sent to endourology specialist. Patient is following up. [phone]

## 2025-03-21 NOTE — TELEPHONE ENCOUNTER
Spoke with pt and informed her of below. Pt states that she would like a call back to know who she is referred to.

## 2025-03-21 NOTE — TELEPHONE ENCOUNTER
Spoke with SEBASTIEN Cárdenas at Dr. Grijalva's office. She states no endo provider in particular, just whoever is accepting new pts.

## 2025-03-24 ENCOUNTER — TELEPHONE (OUTPATIENT)
Dept: FAMILY MEDICINE | Facility: CLINIC | Age: 45
End: 2025-03-24
Payer: OTHER GOVERNMENT

## 2025-03-24 NOTE — TELEPHONE ENCOUNTER
----- Message from Génesis sent at 3/24/2025  8:50 AM CDT -----  Checking on status of this referral. [phone]

## 2025-04-17 ENCOUNTER — OFFICE VISIT (OUTPATIENT)
Dept: ENDOCRINOLOGY | Facility: CLINIC | Age: 45
End: 2025-04-17
Payer: OTHER GOVERNMENT

## 2025-04-17 ENCOUNTER — LAB VISIT (OUTPATIENT)
Dept: LAB | Facility: HOSPITAL | Age: 45
End: 2025-04-17
Attending: PHYSICIAN ASSISTANT
Payer: OTHER GOVERNMENT

## 2025-04-17 VITALS
HEIGHT: 64 IN | DIASTOLIC BLOOD PRESSURE: 74 MMHG | WEIGHT: 163.38 LBS | OXYGEN SATURATION: 98 % | TEMPERATURE: 98 F | BODY MASS INDEX: 27.89 KG/M2 | HEART RATE: 78 BPM | SYSTOLIC BLOOD PRESSURE: 110 MMHG

## 2025-04-17 DIAGNOSIS — E22.1 HYPERPROLACTINEMIA: Primary | ICD-10-CM

## 2025-04-17 DIAGNOSIS — E22.1 HYPERPROLACTINEMIA: ICD-10-CM

## 2025-04-17 LAB
CORTIS SERPL-MCNC: 5.8 UG/DL
PROLACTIN SERPL IA-MCNC: 65.4 NG/ML (ref 5.2–26.5)
T4 FREE SERPL-MCNC: 1.03 NG/DL (ref 0.71–1.51)
TSH SERPL-ACNC: 0.9 UIU/ML (ref 0.4–4)

## 2025-04-17 PROCEDURE — 82024 ASSAY OF ACTH: CPT | Mod: 91

## 2025-04-17 PROCEDURE — 84146 ASSAY OF PROLACTIN: CPT

## 2025-04-17 PROCEDURE — 84146 ASSAY OF PROLACTIN: CPT | Mod: 59

## 2025-04-17 PROCEDURE — 84305 ASSAY OF SOMATOMEDIN: CPT

## 2025-04-17 PROCEDURE — 83003 ASSAY GROWTH HORMONE (HGH): CPT

## 2025-04-17 PROCEDURE — 99203 OFFICE O/P NEW LOW 30 MIN: CPT | Mod: S$PBB,,, | Performed by: PHYSICIAN ASSISTANT

## 2025-04-17 PROCEDURE — 99214 OFFICE O/P EST MOD 30 MIN: CPT | Mod: PBBFAC,PO | Performed by: PHYSICIAN ASSISTANT

## 2025-04-17 PROCEDURE — 82533 TOTAL CORTISOL: CPT

## 2025-04-17 PROCEDURE — 99999 PR PBB SHADOW E&M-EST. PATIENT-LVL IV: CPT | Mod: PBBFAC,,, | Performed by: PHYSICIAN ASSISTANT

## 2025-04-17 PROCEDURE — 84443 ASSAY THYROID STIM HORMONE: CPT

## 2025-04-17 PROCEDURE — 36415 COLL VENOUS BLD VENIPUNCTURE: CPT | Mod: PO

## 2025-04-17 PROCEDURE — 84439 ASSAY OF FREE THYROXINE: CPT

## 2025-04-17 RX ORDER — DEXAMETHASONE 1 MG/1
TABLET ORAL
Qty: 1 TABLET | Refills: 0 | Status: SHIPPED | OUTPATIENT
Start: 2025-04-17

## 2025-04-17 NOTE — PROGRESS NOTES
"CC: Hyperprolactinemia    HPI: Lizbeth Berry is a 45 y.o. female here for hyperprolactinemia along with pending conditions listed in the Visit Diagnosis. Diagnosed in 1/25. No fhx of brain tumors. New to endocrine.  Moved from [place] in 8/23. No breast tenderness, galactorrhea. No easy bruising.   +fatigue, acne, wt gain, HA, muscle weakness, anxiety, hair loss. Cycles have been heavy. No change in shoe size. No supplements. Seeing neurology for HAs. No blurry or double vision.      Latest Reference Range & Units 01/29/25 11:58 03/19/25 07:12   DHEA-SO4 74.8 - 410.2 ug/dL 152.0    Estradiol See Text pg/mL 118    FSH See Text mIU/mL 1.91    Luteinizing Hormone See Text mIU/mL 2.0    Prolactin 5.2 - 26.5 ng/mL 50.3 (H) 74.7 (H)   Sex Hormone Binding Globulin nmol/L 70    Testosterone, Total 5 - 73 ng/dL 28    (H): Data is abnormally high    PMHx, PSHx: reviewed in epic.  Social Hx: no ETOH/tobacco use. She has an 12 yo son. Had preeclampsia.     Wt Readings from Last 10 Encounters:   04/17/25 74.1 kg (163 lb 5.8 oz)   01/29/25 73.5 kg (162 lb 2.4 oz)   01/10/25 72.3 kg (159 lb 6.4 oz)   10/17/24 73.8 kg (162 lb 9.4 oz)   10/11/24 73.9 kg (163 lb)   10/07/24 73.9 kg (163 lb)   10/18/23 72 kg (158 lb 12.8 oz)   10/08/23 68 kg (150 lb)   08/27/23 68 kg (150 lb)      ROS:   Constitutional: No recent significant weight change  Eyes: No recent visual changes  Cardiovascular: Denies current anginal symptoms  Respiratory: Denies current respiratory difficulty  Gastrointestinal: Denies recent bowel disturbances  GenitoUrinary - No dysuria  Skin: No new skin rash  Neurologic: No focal neurologic complaints  Musculoskeletal: no joint pain  Endocrine: no polyphagia, polydipsia or polyuria  Remainder ROS negative     /74 (BP Location: Right arm, Patient Position: Sitting)   Pulse 78   Temp 98.3 °F (36.8 °C) (Oral)   Ht 5' 4" (1.626 m)   Wt 74.1 kg (163 lb 5.8 oz)   SpO2 98%   BMI 28.04 kg/m²      Personally reviewed labs " "below:    No results found for: "TSH", "C3OZRYJ", "X7WEWOK", "THYROIDAB", "FREET4"       Chemistry        Component Value Date/Time     10/02/2024 0804    K 4.0 10/02/2024 0804     10/02/2024 0804    CO2 18 (L) 10/02/2024 0804    BUN 13 10/02/2024 0804    CREATININE 0.73 10/02/2024 0804    GLU 55 (L) 10/02/2024 0804        Component Value Date/Time    CALCIUM 9.5 10/02/2024 0804    AST 19 10/02/2024 0804    ALT 16 10/02/2024 0804    BILITOT 1.0 10/02/2024 0804           No results found for: "HGBA1C"     PE:  GENERAL: Well developed, well nourished  NECK: Supple neck, normal thyroid. No bruit  LYMPHATIC: No cervical or supraclavicular lymphadenopathy  CARDIOVASCULAR: Normal heart sounds, no pedal edema  RESPIRATORY: Normal effort, clear to auscultation  MUSC: 2+ DTR UE/LE  NEURO: steady gait, CN ll-Xll grossly intact  PSYCH: normal mood and affect    Assessment/Plan:   1. Hyperprolactinemia  TSH    T4, Free    Insulin-Like Growth Factor    Macroprolactin, Serum    Growth Hormone    ACTH    Cortisol    Prolactin    MRI Brain W WO Contrast    ACTH    Cortisol    Cortisol    ACTH    Prolactin         Hyperprolactinemia  Check pituitary hormones  Prolactin elevated 2x  Obtain MRI.       FOLLOWUP  Igf1, gh, tfts, acth, cortisol, macroprolactin  Acth, cortisol-different day DMST  MRI  F/u in 6 mths-prolactin    "

## 2025-04-22 LAB
ACTH (OHS): 11 PG/ML
ACTH (OHS): 11 PG/ML
GH SERPL-MCNC: 0.4 NG/ML

## 2025-04-23 LAB
ANNOTATION COMMENT IMP: ABNORMAL
MACROPROLACTIN SERPL-MCNC: 12.4 NG/ML (ref 3.4–18.5)
MACROPROLACTIN/PROLACTIN MFR SERPL: 69 %
PROLACTIN SERPL IA-MCNC: 40.4 NG/ML (ref 4.8–23.3)

## 2025-04-24 ENCOUNTER — OFFICE VISIT (OUTPATIENT)
Dept: PULMONOLOGY | Facility: CLINIC | Age: 45
End: 2025-04-24
Payer: OTHER GOVERNMENT

## 2025-04-24 VITALS — WEIGHT: 163.38 LBS | HEIGHT: 64 IN | HEART RATE: 72 BPM | BODY MASS INDEX: 27.89 KG/M2 | OXYGEN SATURATION: 99 %

## 2025-04-24 DIAGNOSIS — Z80.1 FAMILY HISTORY OF LUNG CANCER: ICD-10-CM

## 2025-04-24 DIAGNOSIS — Z87.09 HISTORY OF REACTIVE AIRWAY DISEASE: Primary | ICD-10-CM

## 2025-04-24 DIAGNOSIS — Z86.19 HISTORY OF RECURRENT PULMONARY INFECTION: ICD-10-CM

## 2025-04-24 DIAGNOSIS — Q32.4 BRONCHIAL ATRESIA: ICD-10-CM

## 2025-04-24 PROCEDURE — 99214 OFFICE O/P EST MOD 30 MIN: CPT | Mod: PBBFAC,PO | Performed by: INTERNAL MEDICINE

## 2025-04-24 PROCEDURE — 99999 PR PBB SHADOW E&M-EST. PATIENT-LVL IV: CPT | Mod: PBBFAC,,, | Performed by: INTERNAL MEDICINE

## 2025-04-24 NOTE — PATIENT INSTRUCTIONS
Continue advair inhaler 2 puffs twice daily as needed for bronchitis  Albuterol as needed  CT chest  Request records from [facility]

## 2025-04-24 NOTE — PROGRESS NOTES
"4/24/2025    Lizbeth Berry  New Patient Consult    Chief Complaint   Patient presents with    Pulmonary Nodules       HPI:04/24/2025- pt reports she has recurrent bronchitis and blockage of the lung on right dx outside- states has been biopsied and benign, congenital bronchial atresia?- no records available at this time.  She has seen multiple pulmonologists and gets periodic CT scans of lungs.  She has moved recently from [place] and is originally from [place]. Her last bronchitis episode was in 1/2025 and she took a z pratik from urgent care.  She uses advair inhaler as needed when sick with bronchitis. She gets sob with running. She uses albuterol about once/month for heavy feeling chest. Denies cough/wheezing    She has family hx of lung cancer in 13 family members. Her sister has copd.  She doesn't smoke. She had very minimal smoking hx in her teens.  She moved to [place] 8/2023.      History of Present Illness               The chief complaint problem is New to me    PFSH:  Past Medical History:   Diagnosis Date    Bronchial atresia          Past Surgical History:   Procedure Laterality Date    COLONOSCOPY W/ BIOPSIES  12/2024     Social History[1]  Family History   Problem Relation Name Age of Onset    Colon cancer Maternal Grandmother      Clotting disorder Father       Review of patient's allergies indicates:   Allergen Reactions    Kiwi (actinidia chinensis) Itching    Latex, natural rubber Rash       Performance Status:The patient's activity level is regular exercise.      Review of Systems:  a review of eleven systems covering constitutional, Eye, HEENT, Psych, Respiratory, Cardiac, GI, , Musculoskeletal, Endocrine, Dermatologic was negative except for pertinent findings as listed ABOVE and below:  Feels anxious and depressed  Breaking out in acne  Wt gain 15-20#  Headaches- sees neuro     Exam:Comprehensive exam done. BP (P) 118/81 (BP Location: Left arm, Patient Position: Sitting)   Pulse 72   Ht 5' 4" " (1.626 m)   Wt 74.1 kg (163 lb 5.8 oz)   SpO2 99%   BMI 28.04 kg/m²   Exam included Vitals as listed, and patient's appearance and affect and alertness and mood, oral exam for yeast and hygiene and pharynx lesions and Mallapatti (M) score, neck with inspection for jvd and masses and thyroid abnormalities and lymph nodes (supraclavicular and infraclavicular nodes and axillary also examined and noted if abn), chest exam included symmetry and effort and fremitus and percussion and auscultation, cardiac exam included rhythm and gallops and murmur and rubs and jvd and edema, abdominal exam for mass and hepatosplenomegaly and tenderness and hernias and bowel sounds, Musculoskeletal exam with muscle tone and posture and mobility/gait and  strength, and skin for rashes and cyanosis and pallor and turgor, extremity for clubbing.  Findings were normal except for pertinent findings listed below:  Physical Exam            M1, oropharynx clear  HR regular  Breath sounds clear bilaterally  No edema/clubbing      Radiographs (ct chest and cxr) reviewed: results reviewed -- will be done and will review CT chest    Labs reviewed     Lab Results   Component Value Date    WBC 6.6 10/02/2024    HGB 15.1 10/02/2024    HCT 46.1 (H) 10/02/2024    MCV 90.7 10/02/2024     10/02/2024       CMP  Sodium   Date Value Ref Range Status   10/02/2024 136 135 - 146 mmol/L Final     Potassium   Date Value Ref Range Status   10/02/2024 4.0 3.5 - 5.3 mmol/L Final     Chloride   Date Value Ref Range Status   10/02/2024 103 98 - 110 mmol/L Final     CO2   Date Value Ref Range Status   10/02/2024 18 (L) 20 - 32 mmol/L Final     Glucose   Date Value Ref Range Status   10/02/2024 55 (L) 65 - 99 mg/dL Final     Comment:                   Fasting reference interval          BUN   Date Value Ref Range Status   10/02/2024 13 7 - 25 mg/dL Final     Creatinine   Date Value Ref Range Status   10/02/2024 0.73 0.50 - 0.99 mg/dL Final     Calcium   Date  Value Ref Range Status   10/02/2024 9.5 8.6 - 10.2 mg/dL Final     Total Protein   Date Value Ref Range Status   10/02/2024 7.9 6.1 - 8.1 g/dL Final     Albumin   Date Value Ref Range Status   10/02/2024 4.8 3.6 - 5.1 g/dL Final     Total Bilirubin   Date Value Ref Range Status   10/02/2024 1.0 0.2 - 1.2 mg/dL Final     AST   Date Value Ref Range Status   10/02/2024 19 10 - 30 U/L Final     ALT   Date Value Ref Range Status   10/02/2024 16 6 - 29 U/L Final     eGFR   Date Value Ref Range Status   10/02/2024 105 > OR = 60 mL/min/1.73m2 Final       PFT will be done and results to be reviewed  Will get records- pt states recently did PFT      Plan:  Clinical impression is reasonably certain and repeated evaluation prn +/- follow up will be needed as below.      Problem List Items Addressed This Visit       Bronchial atresia    Relevant Orders    CT Chest Without Contrast    Family history of lung cancer    History of reactive airway disease - Primary     Other Visit Diagnoses         History of recurrent pulmonary infection        Relevant Orders    CT Chest Without Contrast            Follow up in about 1 year (around 4/24/2026).    Discussed with patient above for education the following:      Patient Instructions   Continue advair inhaler 2 puffs twice daily as needed for bronchitis  Albuterol as needed  CT chest  Request records from [facility]           [1]   Social History  Tobacco Use    Smoking status: Never     Passive exposure: Never    Smokeless tobacco: Never   Substance Use Topics    Alcohol use: Yes     Alcohol/week: 2.0 standard drinks of alcohol     Types: 2 Glasses of wine per week    Drug use: Never

## 2025-04-25 ENCOUNTER — TELEPHONE (OUTPATIENT)
Dept: PULMONOLOGY | Facility: CLINIC | Age: 45
End: 2025-04-25
Payer: OTHER GOVERNMENT

## 2025-04-30 ENCOUNTER — HOSPITAL ENCOUNTER (OUTPATIENT)
Dept: RADIOLOGY | Facility: HOSPITAL | Age: 45
Discharge: HOME | End: 2025-04-30
Attending: INTERNAL MEDICINE
Payer: OTHER GOVERNMENT

## 2025-04-30 ENCOUNTER — HOSPITAL ENCOUNTER (OUTPATIENT)
Dept: RADIOLOGY | Facility: HOSPITAL | Age: 45
Discharge: HOME | End: 2025-04-30
Attending: PHYSICIAN ASSISTANT
Payer: OTHER GOVERNMENT

## 2025-04-30 DIAGNOSIS — Q32.4 BRONCHIAL ATRESIA: ICD-10-CM

## 2025-04-30 DIAGNOSIS — Z86.19 HISTORY OF RECURRENT PULMONARY INFECTION: ICD-10-CM

## 2025-04-30 DIAGNOSIS — E22.1 HYPERPROLACTINEMIA: ICD-10-CM

## 2025-04-30 PROCEDURE — 25500020 PHARM REV CODE 255

## 2025-04-30 PROCEDURE — 71250 CT THORAX DX C-: CPT | Mod: TC

## 2025-04-30 PROCEDURE — A9585 GADOBUTROL INJECTION: HCPCS

## 2025-04-30 PROCEDURE — 70553 MRI BRAIN STEM W/O & W/DYE: CPT | Mod: 26,,, | Performed by: RADIOLOGY

## 2025-04-30 PROCEDURE — 70553 MRI BRAIN STEM W/O & W/DYE: CPT | Mod: TC

## 2025-04-30 PROCEDURE — 71250 CT THORAX DX C-: CPT | Mod: 26,,, | Performed by: RADIOLOGY

## 2025-04-30 RX ORDER — GADOBUTROL 604.72 MG/ML
INJECTION INTRAVENOUS
Status: COMPLETED
Start: 2025-04-30 | End: 2025-04-30

## 2025-04-30 RX ADMIN — GADOBUTROL 7 ML: 604.72 INJECTION INTRAVENOUS at 08:04

## 2025-05-01 ENCOUNTER — RESULTS FOLLOW-UP (OUTPATIENT)
Dept: ENDOCRINOLOGY | Facility: CLINIC | Age: 45
End: 2025-05-01
Payer: OTHER GOVERNMENT

## 2025-05-01 ENCOUNTER — RESULTS FOLLOW-UP (OUTPATIENT)
Dept: PULMONOLOGY | Facility: CLINIC | Age: 45
End: 2025-05-01

## 2025-05-01 DIAGNOSIS — E22.1 HYPERPROLACTINEMIA: Primary | ICD-10-CM

## 2025-05-22 ENCOUNTER — PATIENT MESSAGE (OUTPATIENT)
Dept: FAMILY MEDICINE | Facility: CLINIC | Age: 45
End: 2025-05-22
Payer: OTHER GOVERNMENT

## 2025-05-22 DIAGNOSIS — R79.89 ELEVATED PROLACTIN LEVEL: Primary | ICD-10-CM
